# Patient Record
Sex: MALE | Race: WHITE | NOT HISPANIC OR LATINO | URBAN - METROPOLITAN AREA
[De-identification: names, ages, dates, MRNs, and addresses within clinical notes are randomized per-mention and may not be internally consistent; named-entity substitution may affect disease eponyms.]

---

## 2021-06-17 ENCOUNTER — INPATIENT (INPATIENT)
Facility: HOSPITAL | Age: 51
LOS: 1 days | Discharge: HOME | DRG: 246 | End: 2021-06-19
Attending: HOSPITALIST | Admitting: INTERNAL MEDICINE
Payer: COMMERCIAL

## 2021-06-17 VITALS
WEIGHT: 210.1 LBS | HEART RATE: 120 BPM | TEMPERATURE: 99 F | RESPIRATION RATE: 20 BRPM | SYSTOLIC BLOOD PRESSURE: 157 MMHG | DIASTOLIC BLOOD PRESSURE: 93 MMHG | OXYGEN SATURATION: 95 % | HEIGHT: 67 IN

## 2021-06-17 LAB
A1C WITH ESTIMATED AVERAGE GLUCOSE RESULT: 8.9 % — HIGH (ref 4–5.6)
ALBUMIN SERPL ELPH-MCNC: 3.9 G/DL — SIGNIFICANT CHANGE UP (ref 3.3–5)
ALBUMIN SERPL ELPH-MCNC: 4.5 G/DL — SIGNIFICANT CHANGE UP (ref 3.3–5)
ALP SERPL-CCNC: 73 U/L — SIGNIFICANT CHANGE UP (ref 40–120)
ALP SERPL-CCNC: 83 U/L — SIGNIFICANT CHANGE UP (ref 40–120)
ALT FLD-CCNC: 38 U/L — SIGNIFICANT CHANGE UP (ref 10–45)
ALT FLD-CCNC: 40 U/L — SIGNIFICANT CHANGE UP (ref 10–45)
ANION GAP SERPL CALC-SCNC: 12 MMOL/L — SIGNIFICANT CHANGE UP (ref 5–17)
ANION GAP SERPL CALC-SCNC: 15 MMOL/L — SIGNIFICANT CHANGE UP (ref 5–17)
APTT BLD: 170.8 SEC — CRITICAL HIGH (ref 27.5–35.5)
APTT BLD: 30.6 SEC — SIGNIFICANT CHANGE UP (ref 27.5–35.5)
AST SERPL-CCNC: 134 U/L — HIGH (ref 10–40)
AST SERPL-CCNC: 93 U/L — HIGH (ref 10–40)
BASOPHILS # BLD AUTO: 0.04 K/UL — SIGNIFICANT CHANGE UP (ref 0–0.2)
BASOPHILS # BLD AUTO: 0.05 K/UL — SIGNIFICANT CHANGE UP (ref 0–0.2)
BASOPHILS NFR BLD AUTO: 0.3 % — SIGNIFICANT CHANGE UP (ref 0–2)
BASOPHILS NFR BLD AUTO: 0.4 % — SIGNIFICANT CHANGE UP (ref 0–2)
BILIRUB SERPL-MCNC: 0.9 MG/DL — SIGNIFICANT CHANGE UP (ref 0.2–1.2)
BILIRUB SERPL-MCNC: 1.2 MG/DL — SIGNIFICANT CHANGE UP (ref 0.2–1.2)
BLD GP AB SCN SERPL QL: NEGATIVE — SIGNIFICANT CHANGE UP
BUN SERPL-MCNC: 10 MG/DL — SIGNIFICANT CHANGE UP (ref 7–23)
BUN SERPL-MCNC: 11 MG/DL — SIGNIFICANT CHANGE UP (ref 7–23)
CALCIUM SERPL-MCNC: 8.5 MG/DL — SIGNIFICANT CHANGE UP (ref 8.4–10.5)
CALCIUM SERPL-MCNC: 9.4 MG/DL — SIGNIFICANT CHANGE UP (ref 8.4–10.5)
CHLORIDE SERPL-SCNC: 102 MMOL/L — SIGNIFICANT CHANGE UP (ref 96–108)
CHLORIDE SERPL-SCNC: 105 MMOL/L — SIGNIFICANT CHANGE UP (ref 96–108)
CHOLEST SERPL-MCNC: 190 MG/DL — SIGNIFICANT CHANGE UP
CK MB CFR SERPL CALC: 64.8 NG/ML — HIGH (ref 0–6.7)
CK SERPL-CCNC: 1093 U/L — HIGH (ref 30–200)
CO2 SERPL-SCNC: 22 MMOL/L — SIGNIFICANT CHANGE UP (ref 22–31)
CO2 SERPL-SCNC: 24 MMOL/L — SIGNIFICANT CHANGE UP (ref 22–31)
CREAT SERPL-MCNC: 0.79 MG/DL — SIGNIFICANT CHANGE UP (ref 0.5–1.3)
CREAT SERPL-MCNC: 0.92 MG/DL — SIGNIFICANT CHANGE UP (ref 0.5–1.3)
EOSINOPHIL # BLD AUTO: 0.01 K/UL — SIGNIFICANT CHANGE UP (ref 0–0.5)
EOSINOPHIL # BLD AUTO: 0.02 K/UL — SIGNIFICANT CHANGE UP (ref 0–0.5)
EOSINOPHIL NFR BLD AUTO: 0.1 % — SIGNIFICANT CHANGE UP (ref 0–6)
EOSINOPHIL NFR BLD AUTO: 0.1 % — SIGNIFICANT CHANGE UP (ref 0–6)
ESTIMATED AVERAGE GLUCOSE: 209 MG/DL — HIGH (ref 68–114)
GLUCOSE BLDC GLUCOMTR-MCNC: 201 MG/DL — HIGH (ref 70–99)
GLUCOSE SERPL-MCNC: 203 MG/DL — HIGH (ref 70–99)
GLUCOSE SERPL-MCNC: 228 MG/DL — HIGH (ref 70–99)
HCT VFR BLD CALC: 45.1 % — SIGNIFICANT CHANGE UP (ref 39–50)
HCT VFR BLD CALC: 48.3 % — SIGNIFICANT CHANGE UP (ref 39–50)
HDLC SERPL-MCNC: 39 MG/DL — LOW
HGB BLD-MCNC: 15.7 G/DL — SIGNIFICANT CHANGE UP (ref 13–17)
HGB BLD-MCNC: 16.6 G/DL — SIGNIFICANT CHANGE UP (ref 13–17)
IMM GRANULOCYTES NFR BLD AUTO: 0.5 % — SIGNIFICANT CHANGE UP (ref 0–1.5)
IMM GRANULOCYTES NFR BLD AUTO: 0.5 % — SIGNIFICANT CHANGE UP (ref 0–1.5)
INR BLD: 1.13 — SIGNIFICANT CHANGE UP (ref 0.88–1.16)
LIPID PNL WITH DIRECT LDL SERPL: 137 MG/DL — HIGH
LYMPHOCYTES # BLD AUTO: 14.4 % — SIGNIFICANT CHANGE UP (ref 13–44)
LYMPHOCYTES # BLD AUTO: 16 % — SIGNIFICANT CHANGE UP (ref 13–44)
LYMPHOCYTES # BLD AUTO: 2.08 K/UL — SIGNIFICANT CHANGE UP (ref 1–3.3)
LYMPHOCYTES # BLD AUTO: 2.09 K/UL — SIGNIFICANT CHANGE UP (ref 1–3.3)
MAGNESIUM SERPL-MCNC: 1.9 MG/DL — SIGNIFICANT CHANGE UP (ref 1.6–2.6)
MCHC RBC-ENTMCNC: 28.7 PG — SIGNIFICANT CHANGE UP (ref 27–34)
MCHC RBC-ENTMCNC: 29.1 PG — SIGNIFICANT CHANGE UP (ref 27–34)
MCHC RBC-ENTMCNC: 34.4 GM/DL — SIGNIFICANT CHANGE UP (ref 32–36)
MCHC RBC-ENTMCNC: 34.8 GM/DL — SIGNIFICANT CHANGE UP (ref 32–36)
MCV RBC AUTO: 83.5 FL — SIGNIFICANT CHANGE UP (ref 80–100)
MCV RBC AUTO: 83.6 FL — SIGNIFICANT CHANGE UP (ref 80–100)
MONOCYTES # BLD AUTO: 0.8 K/UL — SIGNIFICANT CHANGE UP (ref 0–0.9)
MONOCYTES # BLD AUTO: 0.93 K/UL — HIGH (ref 0–0.9)
MONOCYTES NFR BLD AUTO: 6.1 % — SIGNIFICANT CHANGE UP (ref 2–14)
MONOCYTES NFR BLD AUTO: 6.4 % — SIGNIFICANT CHANGE UP (ref 2–14)
NEUTROPHILS # BLD AUTO: 10.08 K/UL — HIGH (ref 1.8–7.4)
NEUTROPHILS # BLD AUTO: 11.32 K/UL — HIGH (ref 1.8–7.4)
NEUTROPHILS NFR BLD AUTO: 76.9 % — SIGNIFICANT CHANGE UP (ref 43–77)
NEUTROPHILS NFR BLD AUTO: 78.3 % — HIGH (ref 43–77)
NON HDL CHOLESTEROL: 151 MG/DL — HIGH
NRBC # BLD: 0 /100 WBCS — SIGNIFICANT CHANGE UP (ref 0–0)
NRBC # BLD: 0 /100 WBCS — SIGNIFICANT CHANGE UP (ref 0–0)
PHOSPHATE SERPL-MCNC: 2.5 MG/DL — SIGNIFICANT CHANGE UP (ref 2.5–4.5)
PLATELET # BLD AUTO: 218 K/UL — SIGNIFICANT CHANGE UP (ref 150–400)
PLATELET # BLD AUTO: 251 K/UL — SIGNIFICANT CHANGE UP (ref 150–400)
POTASSIUM SERPL-MCNC: 3.4 MMOL/L — LOW (ref 3.5–5.3)
POTASSIUM SERPL-MCNC: 3.4 MMOL/L — LOW (ref 3.5–5.3)
POTASSIUM SERPL-SCNC: 3.4 MMOL/L — LOW (ref 3.5–5.3)
POTASSIUM SERPL-SCNC: 3.4 MMOL/L — LOW (ref 3.5–5.3)
PROT SERPL-MCNC: 7.1 G/DL — SIGNIFICANT CHANGE UP (ref 6–8.3)
PROT SERPL-MCNC: 8.2 G/DL — SIGNIFICANT CHANGE UP (ref 6–8.3)
PROTHROM AB SERPL-ACNC: 13.5 SEC — SIGNIFICANT CHANGE UP (ref 10.6–13.6)
RBC # BLD: 5.4 M/UL — SIGNIFICANT CHANGE UP (ref 4.2–5.8)
RBC # BLD: 5.78 M/UL — SIGNIFICANT CHANGE UP (ref 4.2–5.8)
RBC # FLD: 12.8 % — SIGNIFICANT CHANGE UP (ref 10.3–14.5)
RBC # FLD: 12.9 % — SIGNIFICANT CHANGE UP (ref 10.3–14.5)
RH IG SCN BLD-IMP: POSITIVE — SIGNIFICANT CHANGE UP
SARS-COV-2 RNA SPEC QL NAA+PROBE: NEGATIVE — SIGNIFICANT CHANGE UP
SODIUM SERPL-SCNC: 139 MMOL/L — SIGNIFICANT CHANGE UP (ref 135–145)
SODIUM SERPL-SCNC: 141 MMOL/L — SIGNIFICANT CHANGE UP (ref 135–145)
TRIGL SERPL-MCNC: 71 MG/DL — SIGNIFICANT CHANGE UP
TROPONIN T SERPL-MCNC: 0.67 NG/ML — CRITICAL HIGH (ref 0–0.01)
TSH SERPL-MCNC: 1.67 UIU/ML — SIGNIFICANT CHANGE UP (ref 0.27–4.2)
WBC # BLD: 13.1 K/UL — HIGH (ref 3.8–10.5)
WBC # BLD: 14.46 K/UL — HIGH (ref 3.8–10.5)
WBC # FLD AUTO: 13.1 K/UL — HIGH (ref 3.8–10.5)
WBC # FLD AUTO: 14.46 K/UL — HIGH (ref 3.8–10.5)

## 2021-06-17 PROCEDURE — 93010 ELECTROCARDIOGRAM REPORT: CPT

## 2021-06-17 PROCEDURE — 93308 TTE F-UP OR LMTD: CPT | Mod: 26

## 2021-06-17 PROCEDURE — 99152 MOD SED SAME PHYS/QHP 5/>YRS: CPT

## 2021-06-17 PROCEDURE — 71045 X-RAY EXAM CHEST 1 VIEW: CPT | Mod: 26

## 2021-06-17 PROCEDURE — 92941 PRQ TRLML REVSC TOT OCCL AMI: CPT | Mod: LD

## 2021-06-17 PROCEDURE — 93458 L HRT ARTERY/VENTRICLE ANGIO: CPT | Mod: 26,59

## 2021-06-17 PROCEDURE — 99291 CRITICAL CARE FIRST HOUR: CPT

## 2021-06-17 RX ORDER — POTASSIUM CHLORIDE 20 MEQ
40 PACKET (EA) ORAL ONCE
Refills: 0 | Status: COMPLETED | OUTPATIENT
Start: 2021-06-17 | End: 2021-06-17

## 2021-06-17 RX ORDER — METOPROLOL TARTRATE 50 MG
5 TABLET ORAL ONCE
Refills: 0 | Status: COMPLETED | OUTPATIENT
Start: 2021-06-17 | End: 2021-06-17

## 2021-06-17 RX ORDER — HEPARIN SODIUM 5000 [USP'U]/ML
INJECTION INTRAVENOUS; SUBCUTANEOUS
Qty: 25000 | Refills: 0 | Status: DISCONTINUED | OUTPATIENT
Start: 2021-06-17 | End: 2021-06-17

## 2021-06-17 RX ORDER — INSULIN LISPRO 100/ML
VIAL (ML) SUBCUTANEOUS AT BEDTIME
Refills: 0 | Status: DISCONTINUED | OUTPATIENT
Start: 2021-06-17 | End: 2021-06-19

## 2021-06-17 RX ORDER — DEXTROSE 50 % IN WATER 50 %
25 SYRINGE (ML) INTRAVENOUS ONCE
Refills: 0 | Status: DISCONTINUED | OUTPATIENT
Start: 2021-06-17 | End: 2021-06-19

## 2021-06-17 RX ORDER — HEPARIN SODIUM 5000 [USP'U]/ML
4000 INJECTION INTRAVENOUS; SUBCUTANEOUS EVERY 6 HOURS
Refills: 0 | Status: DISCONTINUED | OUTPATIENT
Start: 2021-06-17 | End: 2021-06-17

## 2021-06-17 RX ORDER — TICAGRELOR 90 MG/1
90 TABLET ORAL EVERY 12 HOURS
Refills: 0 | Status: DISCONTINUED | OUTPATIENT
Start: 2021-06-18 | End: 2021-06-19

## 2021-06-17 RX ORDER — DEXTROSE 50 % IN WATER 50 %
15 SYRINGE (ML) INTRAVENOUS ONCE
Refills: 0 | Status: DISCONTINUED | OUTPATIENT
Start: 2021-06-17 | End: 2021-06-19

## 2021-06-17 RX ORDER — ASPIRIN/CALCIUM CARB/MAGNESIUM 324 MG
81 TABLET ORAL DAILY
Refills: 0 | Status: DISCONTINUED | OUTPATIENT
Start: 2021-06-18 | End: 2021-06-19

## 2021-06-17 RX ORDER — GLUCAGON INJECTION, SOLUTION 0.5 MG/.1ML
1 INJECTION, SOLUTION SUBCUTANEOUS ONCE
Refills: 0 | Status: DISCONTINUED | OUTPATIENT
Start: 2021-06-17 | End: 2021-06-19

## 2021-06-17 RX ORDER — ACETAMINOPHEN 500 MG
650 TABLET ORAL EVERY 6 HOURS
Refills: 0 | Status: DISCONTINUED | OUTPATIENT
Start: 2021-06-17 | End: 2021-06-19

## 2021-06-17 RX ORDER — NITROGLYCERIN 6.5 MG
0.4 CAPSULE, EXTENDED RELEASE ORAL
Refills: 0 | Status: DISCONTINUED | OUTPATIENT
Start: 2021-06-17 | End: 2021-06-19

## 2021-06-17 RX ORDER — TICAGRELOR 90 MG/1
180 TABLET ORAL ONCE
Refills: 0 | Status: COMPLETED | OUTPATIENT
Start: 2021-06-17 | End: 2021-06-17

## 2021-06-17 RX ORDER — METOPROLOL TARTRATE 50 MG
12.5 TABLET ORAL
Refills: 0 | Status: DISCONTINUED | OUTPATIENT
Start: 2021-06-17 | End: 2021-06-18

## 2021-06-17 RX ORDER — SODIUM CHLORIDE 9 MG/ML
1000 INJECTION, SOLUTION INTRAVENOUS
Refills: 0 | Status: DISCONTINUED | OUTPATIENT
Start: 2021-06-17 | End: 2021-06-19

## 2021-06-17 RX ORDER — INSULIN LISPRO 100/ML
VIAL (ML) SUBCUTANEOUS
Refills: 0 | Status: DISCONTINUED | OUTPATIENT
Start: 2021-06-17 | End: 2021-06-19

## 2021-06-17 RX ORDER — ATORVASTATIN CALCIUM 80 MG/1
80 TABLET, FILM COATED ORAL AT BEDTIME
Refills: 0 | Status: DISCONTINUED | OUTPATIENT
Start: 2021-06-17 | End: 2021-06-19

## 2021-06-17 RX ORDER — CHLORHEXIDINE GLUCONATE 213 G/1000ML
1 SOLUTION TOPICAL
Refills: 0 | Status: DISCONTINUED | OUTPATIENT
Start: 2021-06-17 | End: 2021-06-17

## 2021-06-17 RX ORDER — HEPARIN SODIUM 5000 [USP'U]/ML
4000 INJECTION INTRAVENOUS; SUBCUTANEOUS ONCE
Refills: 0 | Status: COMPLETED | OUTPATIENT
Start: 2021-06-17 | End: 2021-06-17

## 2021-06-17 RX ADMIN — Medication 40 MILLIEQUIVALENT(S): at 21:32

## 2021-06-17 RX ADMIN — Medication 650 MILLIGRAM(S): at 23:09

## 2021-06-17 RX ADMIN — Medication 0.4 MILLIGRAM(S): at 18:30

## 2021-06-17 RX ADMIN — ATORVASTATIN CALCIUM 80 MILLIGRAM(S): 80 TABLET, FILM COATED ORAL at 21:31

## 2021-06-17 RX ADMIN — TICAGRELOR 180 MILLIGRAM(S): 90 TABLET ORAL at 18:30

## 2021-06-17 RX ADMIN — HEPARIN SODIUM 4000 UNIT(S): 5000 INJECTION INTRAVENOUS; SUBCUTANEOUS at 18:24

## 2021-06-17 RX ADMIN — Medication 12.5 MILLIGRAM(S): at 23:59

## 2021-06-17 RX ADMIN — HEPARIN SODIUM 1000 UNIT(S)/HR: 5000 INJECTION INTRAVENOUS; SUBCUTANEOUS at 18:29

## 2021-06-17 RX ADMIN — Medication 650 MILLIGRAM(S): at 22:09

## 2021-06-17 RX ADMIN — Medication 5 MILLIGRAM(S): at 18:27

## 2021-06-17 NOTE — ED PROVIDER NOTE - OBJECTIVE STATEMENT
50 yo male h/o HTN, DM biba c/o cp.  Pt notes exertional cp/sob x ~ 2 wk, constant cp today worse w exertion.  Pt went to urgent care and noted to have abnl ekg, sent by ems.  Pt received asa 325 mg at urgent care and ntg x 3 w ems.  Pain 6/10.  L sided/sscp - pressure like, no radiation, no h/o similar prior to 2 wk ago.  No palpitations, le edema.  Pt reports nl cardiac testing many yrs ago.  No fever, cough.  No fh cad.  EMS ekg w st elevations 1, L, v1, 2, st depressions 2, 3

## 2021-06-17 NOTE — PATIENT PROFILE ADULT - DO YOU FEEL THREATENED BY OTHERS?
no Implemented All Universal Safety Interventions:  [place] to call system. Call bell, personal items and telephone within reach. Instruct patient to call for assistance. Room bathroom lighting operational. Non-slip footwear when patient is off stretcher. Physically safe environment: no spills, clutter or unnecessary equipment. Stretcher in lowest position, wheels locked, appropriate side rails in place.

## 2021-06-17 NOTE — ED PROVIDER NOTE - CLINICAL SUMMARY MEDICAL DECISION MAKING FREE TEXT BOX
Pt biba for cp w ekg changes concerning for stemi.  EKG in ed also w stemi.  STEMI code called.  Pt given brilinta, heparin ordered, metoprolol for elevated bp/hr.  Cards fellow in ed to consent pt and bring to cath lab.  Pt discussed w CCU fellow.  Pt admitted to CCU, to cath lab for eval/treatment.

## 2021-06-17 NOTE — H&P ADULT - ASSESSMENT
51 Y M with PMHX DMT2 on no medications presented to the ED with chest pain and STEMI, s/p DAVIS  %, EF 35-40% on 6/17 transferred to the CCU for post CATH monitoring.         Problem/Plan     NEURO:  No active issues.    CARDIOVASCULAR:  #STEMI. ST elevations in V2-V3.      PULM:  No active issue, Satting well on Ra    GI:  No active issue,    RENAL:      ENDO:       ID:   No active issue,    HEME/ONC:      PREVENTIVE:   Fluids:   Diet: DASH/TLC   DVT ppx: heparin gtt  GI ppx: None  CODE: FULL  Disp: CCU    51 Y M with PMHX DMT2 on no medications presented to the ED with chest pain and STEMI, s/p DAVIS  %, EF 35-40% on 6/17 transferred to the CCU for post CATH monitoring.         Problem/Plan     NEURO:  No active issues.    CARDIOVASCULAR:  #STEMI  Presented to the ED with pressure like exertional chest pain for 2 weeks relived by rest, found out  ST elevations in V2-V3. diagnosed with STEMI and s/p cath received one DAVIS on %.  CATH report 6/17:  L dominant. LM normal. % thrombotic . LVEF 35-40% mod Ant wall hypokinesis, Succesful theromboctemy +PCI on LAD DAVIS   -S/p Loaded Brillinta and ASA in the ED for cath  -C/w ASA 81 mg daily  -C/w Brilinta 90mg BID start AM   - Lipid panel: HDL 39, DEQ802, TG 71  - HA1c 8.9  - TSH 1.6  - Off from heparin gtt  -ASCVD 9.1% recommended high intensity of statin   - C/w Atorvastatin 80mg bedtime      #Pump    LVEF 35-40%   -Started on Lopressor 12.5 BID   -F/u TTE in AM      #Rhythm  NSR HR controlled      PULM:  No active issue, Satting well on Ra    GI:  No active issue,    RENAL:  No active issue     ENDO:   PMHx of DMT2 on no medication. FH of DMT2 on both parents.   -  -HA1c 8.9  -Started on mISS     ID:   No active issue,    HEME/ONC:  Leukocytoses likely reactive     PREVENTIVE:   Fluids: None   Diet: DASH/TLC   DVT ppx: heparin gtt  GI ppx: None  CODE: FULL  Disp: CCU    51 Y M with PMHX DMT2 on no medications presented to the ED with chest pain for 2 weeks, Admitted for STEMI, s/p DAVIS  %, EF 35-40% on 6/17 transferred to the CCU for post CATH monitoring.         Problem/Plan     NEURO:  No active issues.    CARDIOVASCULAR:  #STEMI  Presented to the ED with pressure like exertional chest pain for 2 weeks relived by rest, found ST elevations in V2-V3. diagnosed with STEMI and s/p cath received one DAVIS on %.  CATH report 6/17:  L dominant. LM normal. % thrombotic . LVEF 35-40% mod Ant wall hypokinesis, Successful thrombectomy +PCI on LAD DAVIS   -S/p Loaded Brillinta and ASA in the ED for cath  -C/w ASA 81 mg daily  -C/w Brilinta 90mg BID start AM   - Lipid panel: HDL 39, BTU697, TG 71  - HA1c 8.9  - TSH 1.6  - Off from heparin gtt  -ASCVD 9.1% recommended high intensity of statin   - C/w Atorvastatin 80mg bedtime  -Started on Lopressor 12.5 BID   -Plan for start ACEI/ARB tomorrow       #HFrEF   LVEF 35-40% Likely acute reduced in setting of STEMI  -Started on Lopressor 12.5 BID   -Plan for start ACEI/ARB tomorrow   -F/u TTE in AM  - Consider repeat TTE after dc for re-evaluate the EF, plan with outpatient cardiology       #Rhythm  NSR HR controlled      #Volume  Euvolemic     PULM:  No active issue, Satting well on Ra    GI:  No active issue,    RENAL:  No active issue     ENDO:   PMHx of DMT2 on no medication. FH of DMT2 on both parents.   -  -HA1c 8.9  -Started on mISS   -Consider start metformin after dc     ID:   No active issue,    HEME/ONC:  Leukocytoses likely reactive     PREVENTIVE:   Fluids: s/p 1L NS bolus   E: Replete as needed keep K>4, Mg >2   Diet: DASH/TLC + Consistent carbohydrate   DVT ppx: SCDs for now. Will start Lovenox tomorrow.   GI ppx: None  CODE: FULL  Disp: CCU    51 Y M with PMHX DMT2 on no medications presented to the ED with chest pain for 2 weeks, Admitted for STEMI, s/p DAVIS  %, EF 35-40% on 6/17 transferred to the CCU for post CATH monitoring.         Problem/Plan     NEURO:  No active issues.    CARDIOVASCULAR:  #STEMI  Presented to the ED with pressure like exertional chest pain for 2 weeks relived by rest, found ST elevations in V2-V3. diagnosed with STEMI and s/p cath received one DAVIS on %.  CATH report 6/17:  L dominant. LM normal. % thrombotic . LVEF 35-40% mod Ant wall hypokinesis, Successful thrombectomy +PCI on LAD DAVIS   -S/p Loaded Brillinta and ASA in the ED for cath  -C/w ASA 81 mg daily  -C/w Brilinta 90mg BID start AM   - Lipid panel: HDL 39, NOG939, TG 71  - HA1c 8.9  - TSH 1.6  - Off from heparin gtt  -ASCVD 9.1% recommended high intensity of statin   - C/w Atorvastatin 80mg bedtime  -Started on Lopressor 12.5 BID   -Plan for start ACEI/ARB tomorrow       #HFrEF   LVEF 35-40% Likely acute reduced in setting of STEMI  -Started on Lopressor 12.5 BID   -Plan for start ACEI/ARB tomorrow   -F/u TTE in AM  - Consider repeat TTE after dc for re-evaluate the EF, plan with outpatient cardiology       #Rhythm  NSR HR controlled      #Volume  Euvolemic     PULM:  No active issue, Satting well on Ra    GI:  No active issue,    RENAL:  No active issue     ENDO:   PMHx of DMT2 on no medication. FH of DMT2 on both parents.   -  -HA1c 8.9  -Started on mISS   -Consider start metformin after dc     ID:   No active issue,    HEME/ONC:  Leukocytoses likely reactive       SKIN  #Rosacea   Usually flare up with the body temperature  -Use metronidazole? cream at home sometimes PRN     PREVENTIVE:   Fluids: s/p 1L NS bolus   E: Replete as needed keep K>4, Mg >2   Diet: DASH/TLC + Consistent carbohydrate   DVT ppx: SCDs for now. Will start Lovenox tomorrow.   GI ppx: None  CODE: FULL  Disp: CCU

## 2021-06-17 NOTE — H&P ADULT - NSHPLABSRESULTS_GEN_ALL_CORE
LABS:                         15.7   14.46 >-----< 218           ( 06-17-21 @ 20:55 )             45.1       139  |  105  |   10  ----------------------< 203    (06-17-21 @ 20:55)     3.4  |  22  |  0.79    Anion Gap: 12  ,   141  |  102  |   11  ----------------------< 228    (06-17-21 @ 18:23)     3.4  |  24  |  0.92    Anion Gap: 15    Ca   8.5   (06-17-21 @ 20:55)  Mg   1.9   (06-17-21 @ 20:55)  Phos 2.5   (06-17-21 @ 20:55)       TP 8.5     |  AST 3.9    -------------------------     Alb x     |  ALT x               (06-17-21 @ 20:55)  -------------------------     T-bili 3.9  |  AlkPh 73    D-bili x   COAGULATION STUDIES:     aPTT  170.8 sec    (06-17-21 @ 20:55)     PT     x        (06-17-21 @ 20:55)     INR    x             (06-17-21 @ 20:55), COAGULATION STUDIES:     aPTT  30.6 sec    (06-17-21 @ 18:23)     PT     13.5 sec    (06-17-21 @ 18:23)     INR    1.13          (06-17-21 @ 18:23)     POCT Blood Glucose.: 201 mg/dL (06-17-21 @ 21:37)      I/O SUMMARY:    06-17-21 @ 07:01  -  06-17-21 @ 23:02  --------------------------------------------------------  IN: 200 mL / OUT: 200 mL / NET: 0 mL

## 2021-06-17 NOTE — H&P ADULT - NSICDXFAMILYHX_GEN_ALL_CORE_FT
FAMILY HISTORY:  Father  Still living? Unknown  FH: type 2 diabetes, Age at diagnosis: Age Unknown    Mother  Still living? Unknown  FH: type 2 diabetes, Age at diagnosis: Age Unknown

## 2021-06-17 NOTE — ED ADULT NURSE NOTE - OBJECTIVE STATEMENT
pt received into RESUS room BIBA from urgent care for eval of chest pain x 1 week EKG at [facility] concerning for STEMI EMS placed 20G PIV to L for arm and gave 325 ASA and SL Nitro  x3 ent route pt reports some change and relief of pain s/p nitro. upon arrival Md Director met pt on ems stretcher 12 lead ekgs done and scanned pt A&O3 appears comfortable no head ache blurry vision neuro intact no n/v abd pain diarrhea constipation urinary symptoms denies sob cough fever chills runny nose abd pain abd soft nondistended. new 18G placed to R fore arm labs drawn and sent crisis covid swab sent pt placed on defib pads and portable monitor medicated per orders respirations even and unlabored sating at 98% on 2L NC st elevations noted on ccm pt taken to CATH LAB

## 2021-06-17 NOTE — H&P ADULT - NSHPPHYSICALEXAM_GEN_ALL_CORE
Vital Signs Last 24 Hrs  T(C): 37.1 (17 Jun 2021 18:30), Max: 37.4 (17 Jun 2021 18:06)  T(F): 98.7 (17 Jun 2021 18:30), Max: 99.4 (17 Jun 2021 18:06)  HR: 98 (17 Jun 2021 18:30) (98 - 120)  BP: 150/88 (17 Jun 2021 18:30) (150/88 - 157/93)  BP(mean): --  RR: 16 (17 Jun 2021 18:30) (16 - 20)  SpO2: 98% (17 Jun 2021 18:30) (95% - 98%)    PHYSICAL EXAM:  GENERAL: Pt lying in bed comfortably in NAD  HEAD:  Atraumatic   EYES: EOMI, PERRL, conjunctiva and sclera clear  ENT: Moist mucous membranes  NECK: Supple, No JVD  CHEST/LUNG: Clear to auscultation bilaterally; No rales, rhonchi, wheezing or rubs. Unlabored respirations  HEART: Regular rate and rhythm; No murmurs, rubs, or gallops  ABDOMEN: Bowel sounds present; Soft, Nontender, Nondistended. No guarding or rigidity    EXTREMITIES:  2+ Peripheral Pulses, brisk capillary refill. No clubbing, cyanosis, or edema. Right wrist TR band for access CATH, no bleeding  NERVOUS SYSTEM:  Alert & Oriented X3, speech clear. Answers questions appropriately. Facial movements symmetrical, no facial droop, tongue protrusion midline. Full and equal 5/5 strength B/L upper and lower extremities. +reflexes B/L LE. Sensation intact. No motor drift. No deficits   MSK: FROM x 4 extremities   SKIN: No rashes or lesions

## 2021-06-17 NOTE — H&P ADULT - HISTORY OF PRESENT ILLNESS
HPI:     51M PMH obesity, ____ presented with chest pain x2 weeks    Was given  and Nitro x3 en route to ED.    In the ED:  Initial vital signs: T: 99.4 F, HR: 120, BP: 157/93, RR: 20, SpO2: 95% on 2L NC  ED course:   Labs: significant for WBC 13.1, ______. COVID negative.  CXR: ________  EKG: ST elevations V2-V3  Medications:  heparin 4000U IV x1 then heparin gtt, metoprolol tartrate 5mg IV x1, ticagrelor 180 mg PO x1, nitroglycerin 0.4 mg subLingual x1  Consults: Cardiology   Patient is 50yo M PMH obesity, ____ presented with chest pain x2 weeks    Was given  and Nitro x3 en route to ED.    In the ED:  Initial vital signs: T: 99.4 F, HR: 120, BP: 157/93, RR: 20, SpO2: 95% on 2L NC  ED course:   Labs: significant for WBC 13.1, ______. COVID negative.  CXR: ________  EKG: ST elevations V2-V3  Medications:  heparin 4000U IV x1 then heparin gtt, metoprolol tartrate 5mg IV x1, ticagrelor 180 mg PO x1, nitroglycerin 0.4 mg subLingual x1  Consults: Cardiology   Patient is 52yo M PMH obesity,DMT2 (Stopped taking metformin 3-4 years ago) presented with chest pain x2 weeks to the ED diagnosed with STEMI and sent for CATH and possible PCI. He had pressure like chest pain 6-7/10 started 2 weeks ago worsening with activity and relieved with rest. He denies HA, dizziness, syncope, nausea, vomiting at that time. He was diagnosed with DMT2 years ago with HA1C 12 but stopped taking metformin due to lifestyle and diet changing and HA1c improvement. He has family history of DMT2 in both parents but denies Cardiology problems in family. He went to the [facility] MD for his chest pain which ECG showed ST changes, was given  and Nitro x3 en route. and he was sent to the [facility] ED by ambulance. In the ED given Brilinta, heparin ordered, metoprolol for elevated bp/hr and sent to the Cath lab.   Cath reports L dominant,. LM normal. % thrombotic . LVEF 35-40% mod Ant wall hypokinesis, Successful thrombectomy +PCI on LAD DAVIS.   On CCU arrival: Post cath he was calm in no acute distress, denies HA, SOB, CP, abdominal pain. Right wrist access TR band.         In the ED:  Initial vital signs: T: 99.4 F, HR: 120, BP: 157/93, RR: 20, SpO2: 95% on 2L NC  ED course:   Labs: significant for WBC 13.1, ______. COVID negative.  CXR: ________  EKG: ST elevations V2-V3  Medications:  heparin 4000U IV x1 then heparin gtt, metoprolol tartrate 5mg IV x1, ticagrelor 180 mg PO x1, nitroglycerin 0.4 mg subLingual x1  Consults: Cardiology   Patient is 52yo M PMH obesity,DMT2 (Stopped taking metformin 3-4 years ago) presented with chest pain x2 weeks to the ED diagnosed with STEMI and sent for CATH and possible PCI. He had pressure like chest pain 6-7/10 started 2 weeks ago worsening with activity and relieved with rest. He denies HA, dizziness, syncope, nausea, vomiting at that time. He was diagnosed with DMT2 years ago with HA1C 12 but stopped taking metformin due to lifestyle and diet changing and HA1c improvement. He has family history of DMT2 in both parents but denies Cardiology problems in family. He went to the [facility] MD for his chest pain which ECG showed ST changes, was given  and Nitro x3 en route. and he was sent to the [facility] ED by ambulance. In the ED given Brilinta, heparin ordered, metoprolol for elevated bp/hr and sent to the Cath lab.   ED V/S: T: 99.4 F, HR: 120, BP: 157/93, RR: 20, SpO2: 95% on 2L NC  EKG: ST elevations V2-V3  Medications:  heparin 4000U IV x1 then heparin gtt, metoprolol tartrate 5mg IV x1, ticagrelor 180 mg PO x1, nitroglycerin 0.4 mg subLingual x1    Cath reports L dominant,. LM normal. % thrombotic . LVEF 35-40% mod Ant wall hypokinesis, Successful thrombectomy +PCI on LAD DAVIS.     On CCU arrival: Post cath he was calm in no acute distress, denies HA, SOB, CP, abdominal pain. Right wrist access TR band.   V/S: HR 80, bp 134/84, RR 26,  Sat 97%  ECG post cath: DESTINY on V2-V3 and T inversion in V2-V5      OMHx as above  PSH: none  Allergy: None  FH: MOM and Dad both DMT2 and dementia   Medication: Denies taking any home medications. Stopped taking Metformin 3-4 years ago  Patient is 50yo M [facility] obesity, DMT2 (Stopped taking metformin 3-4 years ago) presented with chest pain x2 weeks to the ED diagnosed with STEMI and sent for CATH and PCI. He had intermittent pressure like substernal non radiating chest pain 6-7/10 started 2 weeks ago worsening with activity and relieved with rest. He denies HA, dizziness, syncope, nausea, vomiting at that time. He has never had this symptoms before. He went to the [facility] MD for worsening of chest pain which ECG showed ST changes, was given  and Nitro x3 and he was sent to the [facility] ED by ambulance.   He was diagnosed with DMT2 years ago with HA1C 12 but stopped taking metformin due to lifestyle and diet changing and HA1c improvement. He has family history of DMT2 in both parents but denies Cardiology problems in family.     ED V/S: T: 99.4 F, HR: 120, BP: 157/93, RR: 20, SpO2: 95% on 2L NC  EKG: ST elevations V2-V3  Treatment:  heparin 4000U IV x1 then heparin gtt, metoprolol tartrate 5mg IV x1, ticagrelor 180 mg PO x1, nitroglycerin 0.4 mg subLingual x1  Cardiology consulted in the ED and sent to the Cath lab.     Cath reports L dominant,. LM normal. % thrombotic . LVEF 35-40% mod Ant wall hypokinesis, Successful thrombectomy +PCI on LAD DAVIS.     On CCU arrival: Post cath he was calm in no acute distress, denies HA, SOB, CP, abdominal pain. Right wrist access TR band.   V/S: HR 80, bp 134/84, RR 26,  Sat 97%  ECG post cath: DESTINY on V2-V3 and T inversion in V2-V5      PMHx as above  PSH: none  Allergy: None  FH: MOM and Dad both DMT2 and dementia   SH: Denies ETOH, smoking cigarettes and drug use  Medication: Denies taking any home medications. Stopped taking Metformin 3-4 years ago  Patient is 52yo M [facility] obesity, DMT2 (Stopped taking metformin 3-4 years ago), Rosacea, presented with chest pain x2 weeks to the ED diagnosed with STEMI and sent for CATH and PCI. He had intermittent pressure like substernal non radiating chest pain 6-7/10 started 2 weeks ago worsening with activity and relieved with rest. He denies HA, dizziness, syncope, nausea, vomiting at that time. He has never had this symptoms before. He went to the [facility] MD for worsening of chest pain which ECG showed ST changes, was given  and Nitro x3 and he was sent to the [facility] ED by ambulance.   He was diagnosed with DMT2 years ago with HA1C 12 but stopped taking metformin due to lifestyle and diet changing and HA1c improvement. He has family history of DMT2 in both parents but denies Cardiology problems in family.     ED V/S: T: 99.4 F, HR: 120, BP: 157/93, RR: 20, SpO2: 95% on 2L NC  EKG: ST elevations V2-V3  Treatment:  heparin 4000U IV x1 then heparin gtt, metoprolol tartrate 5mg IV x1, ticagrelor 180 mg PO x1, nitroglycerin 0.4 mg subLingual x1  Cardiology consulted in the ED and sent to the Cath lab.     Cath reports L dominant,. LM normal. % thrombotic . LVEF 35-40% mod Ant wall hypokinesis, Successful thrombectomy +PCI on LAD DAVIS.     On CCU arrival: Post cath he was calm in no acute distress, denies HA, SOB, CP, abdominal pain. Right wrist access TR band.   V/S: HR 80, bp 134/84, RR 26,  Sat 97%  ECG post cath: DESTINY on V2-V3 and T inversion in V2-V5      PMHx as above  PSH: none  Allergy: None  FH: MOM and Dad both DMT2 and dementia   SH: Denies ETOH, smoking cigarettes and drug use  Medication: Denies taking any home medications. Stopped taking Metformin 3-4 years ago

## 2021-06-18 ENCOUNTER — TRANSCRIPTION ENCOUNTER (OUTPATIENT)
Age: 51
End: 2021-06-18

## 2021-06-18 DIAGNOSIS — E11.9 TYPE 2 DIABETES MELLITUS WITHOUT COMPLICATIONS: ICD-10-CM

## 2021-06-18 DIAGNOSIS — R63.8 OTHER SYMPTOMS AND SIGNS CONCERNING FOOD AND FLUID INTAKE: ICD-10-CM

## 2021-06-18 DIAGNOSIS — I21.3 ST ELEVATION (STEMI) MYOCARDIAL INFARCTION OF UNSPECIFIED SITE: ICD-10-CM

## 2021-06-18 DIAGNOSIS — I50.21 ACUTE SYSTOLIC (CONGESTIVE) HEART FAILURE: ICD-10-CM

## 2021-06-18 DIAGNOSIS — L71.9 ROSACEA, UNSPECIFIED: ICD-10-CM

## 2021-06-18 LAB
ALBUMIN SERPL ELPH-MCNC: 3.5 G/DL — SIGNIFICANT CHANGE UP (ref 3.3–5)
ALP SERPL-CCNC: 66 U/L — SIGNIFICANT CHANGE UP (ref 40–120)
ALT FLD-CCNC: 38 U/L — SIGNIFICANT CHANGE UP (ref 10–45)
ANION GAP SERPL CALC-SCNC: 13 MMOL/L — SIGNIFICANT CHANGE UP (ref 5–17)
APPEARANCE UR: CLEAR — SIGNIFICANT CHANGE UP
AST SERPL-CCNC: 104 U/L — HIGH (ref 10–40)
BACTERIA # UR AUTO: PRESENT /HPF
BASOPHILS # BLD AUTO: 0.03 K/UL — SIGNIFICANT CHANGE UP (ref 0–0.2)
BASOPHILS NFR BLD AUTO: 0.3 % — SIGNIFICANT CHANGE UP (ref 0–2)
BILIRUB SERPL-MCNC: 1.5 MG/DL — HIGH (ref 0.2–1.2)
BILIRUB UR-MCNC: NEGATIVE — SIGNIFICANT CHANGE UP
BUN SERPL-MCNC: 10 MG/DL — SIGNIFICANT CHANGE UP (ref 7–23)
CALCIUM SERPL-MCNC: 8.8 MG/DL — SIGNIFICANT CHANGE UP (ref 8.4–10.5)
CHLORIDE SERPL-SCNC: 103 MMOL/L — SIGNIFICANT CHANGE UP (ref 96–108)
CK MB CFR SERPL CALC: 38.8 NG/ML — HIGH (ref 0–6.7)
CK MB CFR SERPL CALC: 56.7 NG/ML — HIGH (ref 0–6.7)
CK SERPL-CCNC: 1275 U/L — HIGH (ref 30–200)
CK SERPL-CCNC: 966 U/L — HIGH (ref 30–200)
CO2 SERPL-SCNC: 21 MMOL/L — LOW (ref 22–31)
COLOR SPEC: YELLOW — SIGNIFICANT CHANGE UP
COVID-19 SPIKE DOMAIN AB INTERP: POSITIVE
COVID-19 SPIKE DOMAIN ANTIBODY RESULT: >250 U/ML — HIGH
CREAT SERPL-MCNC: 0.73 MG/DL — SIGNIFICANT CHANGE UP (ref 0.5–1.3)
DIFF PNL FLD: ABNORMAL
EOSINOPHIL # BLD AUTO: 0.05 K/UL — SIGNIFICANT CHANGE UP (ref 0–0.5)
EOSINOPHIL NFR BLD AUTO: 0.5 % — SIGNIFICANT CHANGE UP (ref 0–6)
EPI CELLS # UR: SIGNIFICANT CHANGE UP /HPF (ref 0–5)
GLUCOSE BLDC GLUCOMTR-MCNC: 186 MG/DL — HIGH (ref 70–99)
GLUCOSE BLDC GLUCOMTR-MCNC: 199 MG/DL — HIGH (ref 70–99)
GLUCOSE BLDC GLUCOMTR-MCNC: 210 MG/DL — HIGH (ref 70–99)
GLUCOSE BLDC GLUCOMTR-MCNC: 216 MG/DL — HIGH (ref 70–99)
GLUCOSE SERPL-MCNC: 223 MG/DL — HIGH (ref 70–99)
GLUCOSE UR QL: 100
HCT VFR BLD CALC: 43 % — SIGNIFICANT CHANGE UP (ref 39–50)
HGB BLD-MCNC: 14.9 G/DL — SIGNIFICANT CHANGE UP (ref 13–17)
HYALINE CASTS # UR AUTO: SIGNIFICANT CHANGE UP /LPF (ref 0–2)
IMM GRANULOCYTES NFR BLD AUTO: 0.7 % — SIGNIFICANT CHANGE UP (ref 0–1.5)
KETONES UR-MCNC: 40 MG/DL
LEUKOCYTE ESTERASE UR-ACNC: NEGATIVE — SIGNIFICANT CHANGE UP
LYMPHOCYTES # BLD AUTO: 1.68 K/UL — SIGNIFICANT CHANGE UP (ref 1–3.3)
LYMPHOCYTES # BLD AUTO: 17.1 % — SIGNIFICANT CHANGE UP (ref 13–44)
MAGNESIUM SERPL-MCNC: 2 MG/DL — SIGNIFICANT CHANGE UP (ref 1.6–2.6)
MCHC RBC-ENTMCNC: 28.9 PG — SIGNIFICANT CHANGE UP (ref 27–34)
MCHC RBC-ENTMCNC: 34.7 GM/DL — SIGNIFICANT CHANGE UP (ref 32–36)
MCV RBC AUTO: 83.3 FL — SIGNIFICANT CHANGE UP (ref 80–100)
MONOCYTES # BLD AUTO: 1.11 K/UL — HIGH (ref 0–0.9)
MONOCYTES NFR BLD AUTO: 11.3 % — SIGNIFICANT CHANGE UP (ref 2–14)
NEUTROPHILS # BLD AUTO: 6.91 K/UL — SIGNIFICANT CHANGE UP (ref 1.8–7.4)
NEUTROPHILS NFR BLD AUTO: 70.1 % — SIGNIFICANT CHANGE UP (ref 43–77)
NITRITE UR-MCNC: NEGATIVE — SIGNIFICANT CHANGE UP
NRBC # BLD: 0 /100 WBCS — SIGNIFICANT CHANGE UP (ref 0–0)
PH UR: 7.5 — SIGNIFICANT CHANGE UP (ref 5–8)
PHOSPHATE SERPL-MCNC: 2.8 MG/DL — SIGNIFICANT CHANGE UP (ref 2.5–4.5)
PLATELET # BLD AUTO: 212 K/UL — SIGNIFICANT CHANGE UP (ref 150–400)
POTASSIUM SERPL-MCNC: 3.7 MMOL/L — SIGNIFICANT CHANGE UP (ref 3.5–5.3)
POTASSIUM SERPL-SCNC: 3.7 MMOL/L — SIGNIFICANT CHANGE UP (ref 3.5–5.3)
PROT SERPL-MCNC: 6.8 G/DL — SIGNIFICANT CHANGE UP (ref 6–8.3)
PROT UR-MCNC: 100 MG/DL
RBC # BLD: 5.16 M/UL — SIGNIFICANT CHANGE UP (ref 4.2–5.8)
RBC # FLD: 13 % — SIGNIFICANT CHANGE UP (ref 10.3–14.5)
RBC CASTS # UR COMP ASSIST: < 5 /HPF — SIGNIFICANT CHANGE UP
SARS-COV-2 IGG+IGM SERPL QL IA: >250 U/ML — HIGH
SARS-COV-2 IGG+IGM SERPL QL IA: POSITIVE
SODIUM SERPL-SCNC: 137 MMOL/L — SIGNIFICANT CHANGE UP (ref 135–145)
SP GR SPEC: 1.01 — SIGNIFICANT CHANGE UP (ref 1–1.03)
TROPONIN T SERPL-MCNC: 1.58 NG/ML — CRITICAL HIGH (ref 0–0.01)
TROPONIN T SERPL-MCNC: 2.19 NG/ML — CRITICAL HIGH (ref 0–0.01)
UROBILINOGEN FLD QL: 0.2 E.U./DL — SIGNIFICANT CHANGE UP
WBC # BLD: 9.85 K/UL — SIGNIFICANT CHANGE UP (ref 3.8–10.5)
WBC # FLD AUTO: 9.85 K/UL — SIGNIFICANT CHANGE UP (ref 3.8–10.5)
WBC UR QL: < 5 /HPF — SIGNIFICANT CHANGE UP

## 2021-06-18 PROCEDURE — 71045 X-RAY EXAM CHEST 1 VIEW: CPT | Mod: 26

## 2021-06-18 PROCEDURE — 99291 CRITICAL CARE FIRST HOUR: CPT

## 2021-06-18 PROCEDURE — 93306 TTE W/DOPPLER COMPLETE: CPT | Mod: 26

## 2021-06-18 PROCEDURE — 93010 ELECTROCARDIOGRAM REPORT: CPT

## 2021-06-18 RX ORDER — METOPROLOL TARTRATE 50 MG
25 TABLET ORAL DAILY
Refills: 0 | Status: DISCONTINUED | OUTPATIENT
Start: 2021-06-19 | End: 2021-06-19

## 2021-06-18 RX ORDER — POTASSIUM CHLORIDE 20 MEQ
20 PACKET (EA) ORAL ONCE
Refills: 0 | Status: COMPLETED | OUTPATIENT
Start: 2021-06-18 | End: 2021-06-18

## 2021-06-18 RX ORDER — TICAGRELOR 90 MG/1
1 TABLET ORAL
Qty: 60 | Refills: 5
Start: 2021-06-18 | End: 2021-12-14

## 2021-06-18 RX ORDER — ENOXAPARIN SODIUM 100 MG/ML
40 INJECTION SUBCUTANEOUS EVERY 24 HOURS
Refills: 0 | Status: DISCONTINUED | OUTPATIENT
Start: 2021-06-18 | End: 2021-06-19

## 2021-06-18 RX ORDER — METOPROLOL TARTRATE 50 MG
25 TABLET ORAL DAILY
Refills: 0 | Status: DISCONTINUED | OUTPATIENT
Start: 2021-06-18 | End: 2021-06-18

## 2021-06-18 RX ORDER — LOSARTAN POTASSIUM 100 MG/1
25 TABLET, FILM COATED ORAL DAILY
Refills: 0 | Status: DISCONTINUED | OUTPATIENT
Start: 2021-06-18 | End: 2021-06-19

## 2021-06-18 RX ORDER — METOPROLOL TARTRATE 50 MG
12.5 TABLET ORAL ONCE
Refills: 0 | Status: DISCONTINUED | OUTPATIENT
Start: 2021-06-19 | End: 2021-06-19

## 2021-06-18 RX ADMIN — Medication 20 MILLIEQUIVALENT(S): at 06:35

## 2021-06-18 RX ADMIN — Medication 81 MILLIGRAM(S): at 11:29

## 2021-06-18 RX ADMIN — Medication 4: at 11:28

## 2021-06-18 RX ADMIN — TICAGRELOR 90 MILLIGRAM(S): 90 TABLET ORAL at 06:07

## 2021-06-18 RX ADMIN — Medication 4: at 06:35

## 2021-06-18 RX ADMIN — TICAGRELOR 90 MILLIGRAM(S): 90 TABLET ORAL at 17:21

## 2021-06-18 RX ADMIN — ENOXAPARIN SODIUM 40 MILLIGRAM(S): 100 INJECTION SUBCUTANEOUS at 10:49

## 2021-06-18 RX ADMIN — ATORVASTATIN CALCIUM 80 MILLIGRAM(S): 80 TABLET, FILM COATED ORAL at 21:03

## 2021-06-18 RX ADMIN — LOSARTAN POTASSIUM 25 MILLIGRAM(S): 100 TABLET, FILM COATED ORAL at 09:14

## 2021-06-18 RX ADMIN — Medication 12.5 MILLIGRAM(S): at 06:07

## 2021-06-18 NOTE — PROGRESS NOTE ADULT - ATTENDING COMMENTS
Presented to the ED with pressure like exertional chest pain for 2 weeks relived by rest, found ST elevations in V2-V3. diagnosed with STEMI and s/p cath received one DAVIS on %.  CATH report 6/17:  L dominant. LM normal. % thrombotic . LVEF 35-40% mod Ant wall hypokinesis, Successful thrombectomy +PCI on LAD DAVIS   -S/p Brillinta and ASA load in the ED, s/p hep drip prior PCI  -C/w ASA 81 mg daily  -C/w Brilinta 90mg BID  -C/w Atorvastatin 80mg bedtime  -C/w Lopressor 12.5 BID   -C/w Losartan 25mg daily - will f/u with pt pharmacy if covered for Entresto.  -F/u official TTE  -Trop up trending post cath, 0.67 -> 2.19; CKMB downtrending 64.8 -> 56.7; expected post PCI with reperfusion f/u trop q6 to pick.       Risk factors:  - ASCVD with 10yr risk for stroke of 9.1% recommended high intensity of statin   - Lipid panel: HDL 39, DUW379, TG 71  - HA1c 8.9  - TSH 1.6    Ambulate on tele    I have personally provided 45 minutes of critical care time concurrently with the resident and  fellow.  This time excludes time spent on separate procedures and time spent teaching. I have reviewed the resident’s  documentation and I agree with the resident’s assessment and plan of care.  ILYA Trevino

## 2021-06-18 NOTE — PROGRESS NOTE ADULT - PROBLEM SELECTOR PLAN 4
Usually flare up with the body temperature  -Use metronidazole? cream at home sometimes PRN     #Fever: most likely post-procedural  - Fever 101.3, 2 sets blood cultures collected, not starting ABx as patient not septic, WBC resolved

## 2021-06-18 NOTE — DISCHARGE NOTE PROVIDER - HOSPITAL COURSE
Patient is __ yo M/F with past medical history of _____  Presented with _____, found to have _____  Problem List/Main Diagnoses (system-based):   Inpatient treatment course:   New medications:   Labs to be followed outpatient:   Exam to be followed outpatient:    INCOMPLETE    51 Y M with PMHX DMT2 on no medications presented to the ED with chest pain for 2 weeks, Admitted for STEMI, s/p DAVIS  %, EF 35-40% on 6/17 transferred to the CCU for post CATH monitoring.     Problem List/Main Diagnoses (system-based):   #STEMI (ST elevation myocardial infarction).  Plan: Presented to the ED with pressure like exertional chest pain for 2 weeks relived by rest, found ST elevations in V2-V3. diagnosed with STEMI and s/p cath received one DAVIS on %.  CATH report 6/17:  L dominant. LM normal. % thrombotic . LVEF 35-40% mod Ant wall hypokinesis, Successful thrombectomy +PCI on LAD DAVIS   -S/p Brillinta and ASA load in the ED, s/p hep drip prior PCI  -C/w ASA 81 mg daily, Brilinta 90mg BID, Atorvastatin 80mg bedtime, Lopressor 12.5 BID   -C/w Losartan 25mg daily - will f/u with pt pharmacy if covered for Entresto.  - TTE 6/18: Mild symmetric left ventricular hypertrophy. Moderate segmental left ventricular systolic dysfunction with severe hypokineisis of mid anteroseptum, mid inferoseptum, apical septum, apical inferior, apex proper, and apical anterior. LVEF is 35%. Grade I left ventricular diastolic dysfunction.    #CAD  - Management as above.    # Acute HFrEF (heart failure with reduced ejection fraction).  Plan: No history, new onset. LVEF 35-40% Likely acute reduced in setting of STEMI  -C/w Lopressor 12.5 BID   -C/w Losartan 25mg daily - will f/u with pt pharmacy if covered for Entresto.  - Outpatient cardiology follow up.     #DM (diabetes mellitus).  Plan: on no medication. FH of DMT2 on both parents.   -HA1c 8.9  -Consider start metformin 500mg BID after dc.     #Rosacea.  Plan: Usually flare up with the body temperature  - f/u outpatient    Inpatient treatment course:     New medications:   Labs to be followed outpatient:   Exam to be followed outpatient:       Dx: Heart Failure, Unstable Angina/ACS/NSTEMI/STEMI this Admit or History of Heart Failure, : YES           Cardiac Rehab (STEMI/NSTEMI/ACS/Unstable Angina/CHF/Post PCI):            *Education on benefits of Cardiac Rehab provided to patient: Yes         *Referral and Prescription Given for Cardiac Rehab: Yes/         *Pt given list of locations & instructed to contact their insurance company to review list of participating providers. Yes         *Pt instructed to bring Cardiac Rehab prescription with them to Cardiology Follow up appointment for assistance with enrollment: Yes    AMI: Beta Blocker Prescribed: Yes            ACE-I/ARB Prescribed: Yes  CHF: Beta Blocker Prescribed: Yes           ACE-I/ARB/Entresto Prescribed: Yes  Statin Prescribed (STEMI/NSTEMI/ACS/UA &/OR Post PCI this admission):  Yes  DAPT: Prescriptions for Aspirin/Brilinta e-prescribed to patient's pharmacy: Yes                 INCOMPLETE    51 Y M with PMHX DMT2 on no medications presented to the ED with chest pain for 2 weeks, Admitted for STEMI, s/p DAVIS  %, EF 35-40% on 6/17 transferred to the CCU for post CATH monitoring.     Problem List/Main Diagnoses (system-based):   #STEMI (ST elevation myocardial infarction).  Plan: Presented to the ED with pressure like exertional chest pain for 2 weeks relived by rest, found ST elevations in V2-V3. diagnosed with STEMI and s/p cath received one DAVIS on %.  CATH report 6/17:  L dominant. LM normal. % thrombotic . LVEF 35-40% mod Ant wall hypokinesis, Successful thrombectomy +PCI on LAD DAVIS   -S/p Brillinta and ASA load in the ED, s/p hep drip prior PCI  -C/w ASA 81 mg daily, Brilinta 90mg BID, Atorvastatin 80mg bedtime, Toprol XL,    - TTE 6/18: Mild symmetric left ventricular hypertrophy. Moderate segmental left ventricular systolic dysfunction with severe hypokineisis of mid anteroseptum, mid inferoseptum, apical septum, apical inferior, apex proper, and apical anterior. LVEF is 35%. Grade I left ventricular diastolic dysfunction.    #CAD  - Management as above.    # Acute HFrEF (heart failure with reduced ejection fraction).  Plan: No history, new onset. LVEF 35-40% Likely acute reduced in setting of STEMI  -Continue with Toprol  - Outpatient cardiology follow up.     #DM (diabetes mellitus).  Plan: on no medication. FH of DMT2 on both parents.   -HA1c 8.9  -Consider start metformin 500mg BID after dc.     #Rosacea.  Plan: Usually flare up with the body temperature  - f/u outpatient    Inpatient treatment course:     New medications:   Labs to be followed outpatient:   Exam to be followed outpatient:       Dx: Heart Failure, Unstable Angina/ACS/NSTEMI/STEMI this Admit or History of Heart Failure, : YES           Cardiac Rehab (STEMI/NSTEMI/ACS/Unstable Angina/CHF/Post PCI):            *Education on benefits of Cardiac Rehab provided to patient: Yes         *Referral and Prescription Given for Cardiac Rehab: Yes/         *Pt given list of locations & instructed to contact their insurance company to review list of participating providers. Yes         *Pt instructed to bring Cardiac Rehab prescription with them to Cardiology Follow up appointment for assistance with enrollment: Yes    AMI: Beta Blocker Prescribed: Yes            ACE-I/ARB Prescribed: Yes  CHF: Beta Blocker Prescribed: Yes           ACE-I/ARB/Entresto Prescribed: Yes  Statin Prescribed (STEMI/NSTEMI/ACS/UA &/OR Post PCI this admission):  Yes  DAPT: Prescriptions for Aspirin/Brilinta e-prescribed to patient's pharmacy: Yes

## 2021-06-18 NOTE — PROGRESS NOTE ADULT - SUBJECTIVE AND OBJECTIVE BOX
**INCOMPLETE NOTE    OVERNIGHT EVENTS:    SUBJECTIVE:  Patient seen and examined at bedside.    Vital Signs Last 12 Hrs  T(F): 99.3 (21 @ 09:06), Max: 99.3 (21 @ 09:06)  HR: 70 (21 @ 10:00) (59 - 87)  BP: 131/72 (21 @ 10:00) (118/81 - 151/74)  BP(mean): 96 (21 @ 10:00) (90 - 104)  RR: 21 (21 @ 10:00) (18 - 31)  SpO2: 95% (21 @ 10:00) (93% - 97%)  I&O's Summary    2021 07:01  -  2021 07:00  --------------------------------------------------------  IN: 320 mL / OUT: 600 mL / NET: -280 mL    2021 07:01  -  2021 11:08  --------------------------------------------------------  IN: 180 mL / OUT: 0 mL / NET: 180 mL        PHYSICAL EXAM:  Constitutional: NAD, comfortable in bed.  HEENT: NC/AT, PERRLA, EOMI, no conjunctival pallor or scleral icterus, MMM  Neck: Supple, no JVD  Respiratory: CTA B/L. No w/r/r.   Cardiovascular: RRR, normal S1 and S2, no m/r/g.   Gastrointestinal: +BS, soft NTND, no guarding or rebound tenderness, no palpable masses   Extremities: wwp; no cyanosis, clubbing or edema.   Vascular: Pulses equal and strong throughout.   Neurological: AAOx3, no CN deficits, strength and sensation intact throughout.   Skin: No gross skin abnormalities or rashes        LABS:                        14.9   9.85  )-----------( 212      ( 2021 05:38 )             43.0     06-18    137  |  103  |  10  ----------------------------<  223<H>  3.7   |  21<L>  |  0.73    Ca    8.8      2021 05:38  Phos  2.8     -18  Mg     2.0     -18    TPro  6.8  /  Alb  3.5  /  TBili  1.5<H>  /  DBili  x   /  AST  104<H>  /  ALT  38  /  AlkPhos  66  06-18    PT/INR - ( 2021 18:23 )   PT: 13.5 sec;   INR: 1.13          PTT - ( 2021 20:55 )  PTT:170.8 sec  Urinalysis Basic - ( 2021 00:26 )    Color: Yellow / Appearance: Clear / S.010 / pH: x  Gluc: x / Ketone: 40 mg/dL  / Bili: Negative / Urobili: 0.2 E.U./dL   Blood: x / Protein: 100 mg/dL / Nitrite: NEGATIVE   Leuk Esterase: NEGATIVE / RBC: < 5 /HPF / WBC < 5 /HPF   Sq Epi: x / Non Sq Epi: 0-5 /HPF / Bacteria: Present /HPF          RADIOLOGY & ADDITIONAL TESTS:    MEDICATIONS  (STANDING):  aspirin enteric coated 81 milliGRAM(s) Oral daily  atorvastatin 80 milliGRAM(s) Oral at bedtime  dextrose 40% Gel 15 Gram(s) Oral once  dextrose 5%. 1000 milliLiter(s) (50 mL/Hr) IV Continuous <Continuous>  dextrose 50% Injectable 25 Gram(s) IV Push once  enoxaparin Injectable 40 milliGRAM(s) SubCutaneous every 24 hours  glucagon  Injectable 1 milliGRAM(s) IntraMuscular once  insulin lispro (ADMELOG) corrective regimen sliding scale   SubCutaneous three times a day before meals  insulin lispro (ADMELOG) corrective regimen sliding scale   SubCutaneous at bedtime  losartan 25 milliGRAM(s) Oral daily  metoprolol tartrate 12.5 milliGRAM(s) Oral two times a day  ticagrelor 90 milliGRAM(s) Oral every 12 hours    MEDICATIONS  (PRN):  acetaminophen   Tablet .. 650 milliGRAM(s) Oral every 6 hours PRN Temp greater or equal to 38C (100.4F)  nitroglycerin     SubLingual 0.4 milliGRAM(s) SubLingual every 5 minutes PRN Chest Pain   ** Transfer Note From CCU To Cardiology **    HPI:  51 Y M with PMHX DMT2 on no medications (used to be on metformin 1000BID stopped 3-4 years ago) presented to the ED with intermittent chest pain for 2 weeks that worsen and become persistent at rest on day of admission. Admitted for STEMI (anterior leads), s/p DAVIS to % with a thrombectomy on , TTE on  with  EF 35-40%, transferred to the CCU for post CATH monitoring. On repeat EKG post cath diffuse ST elevation V1-V2 (no TX depressions) and TWI.    OVERNIGHT EVENTS: TR band terri removed, w/ good pulses, no signs of hematoma. Patient with Tmax of 101.3 poet PCI, cultures obtained, no WBC/ signs of infection. Not started on ABx.      SUBJECTIVE:  Patient seen and examined at bedside. Patient's chest pain has resolved, Patient denies h/n/v/d, fever, chills, cp, palpitations, sob, abd pain, leg swelling, rashes, dysuria, and changes in BM.      Vital Signs Last 12 Hrs  T(F): 99.3 (21 @ 09:06), Max: 99.3 (21 @ 09:06)  HR: 70 (21 @ 10:00) (59 - 87)  BP: 131/72 (21 @ 10:00) (118/81 - 151/74)  BP(mean): 96 (21 @ 10:00) (90 - 104)  RR: 21 (21 @ 10:00) (18 - 31)  SpO2: 95% (21 @ 10:00) (93% - 97%)  I&O's Summary    2021 07:  -  2021 07:00  --------------------------------------------------------  IN: 320 mL / OUT: 600 mL / NET: -280 mL    2021 07:  -  2021 11:08  --------------------------------------------------------  IN: 180 mL / OUT: 0 mL / NET: 180 mL    PHYSICAL EXAM:  Constitutional: NAD, comfortable in bed.  HEENT: NC/AT, PERRLA, EOMI, no conjunctival pallor or scleral icterus, MMM  Neck: Supple, no JVD  Respiratory: CTA B/L. No w/r/r.   Cardiovascular: RRR, normal S1 and S2, no m/r/g.   Gastrointestinal: +BS, soft NTND, no guarding or rebound tenderness, no palpable masses   Extremities: wwp; no cyanosis, clubbing or edema. R. wrist w/ good pulses, no signs of hematoma.   Vascular: Pulses equal and strong throughout.   Neurological: AAOx3, no CN deficits, strength and sensation intact throughout.   Skin: No gross skin abnormalities or rashes    LABS:                        14.9   9.85  )-----------( 212      ( 2021 05:38 )             43.0     06-18    137  |  103  |  10  ----------------------------<  223<H>  3.7   |  21<L>  |  0.73    Ca    8.8      2021 05:38  Phos  2.8     06-18  Mg     2.0     06-18    TPro  6.8  /  Alb  3.5  /  TBili  1.5<H>  /  DBili  x   /  AST  104<H>  /  ALT  38  /  AlkPhos  66  06-18    PT/INR - ( 2021 18:23 )   PT: 13.5 sec;   INR: 1.13          PTT - ( 2021 20:55 )  PTT:170.8 sec  Urinalysis Basic - ( 2021 00:26 )    Color: Yellow / Appearance: Clear / S.010 / pH: x  Gluc: x / Ketone: 40 mg/dL  / Bili: Negative / Urobili: 0.2 E.U./dL   Blood: x / Protein: 100 mg/dL / Nitrite: NEGATIVE   Leuk Esterase: NEGATIVE / RBC: < 5 /HPF / WBC < 5 /HPF   Sq Epi: x / Non Sq Epi: 0-5 /HPF / Bacteria: Present /HPF    MEDICATIONS  (STANDING):  aspirin enteric coated 81 milliGRAM(s) Oral daily  atorvastatin 80 milliGRAM(s) Oral at bedtime  dextrose 40% Gel 15 Gram(s) Oral once  dextrose 5%. 1000 milliLiter(s) (50 mL/Hr) IV Continuous <Continuous>  dextrose 50% Injectable 25 Gram(s) IV Push once  enoxaparin Injectable 40 milliGRAM(s) SubCutaneous every 24 hours  glucagon  Injectable 1 milliGRAM(s) IntraMuscular once  insulin lispro (ADMELOG) corrective regimen sliding scale   SubCutaneous three times a day before meals  insulin lispro (ADMELOG) corrective regimen sliding scale   SubCutaneous at bedtime  losartan 25 milliGRAM(s) Oral daily  metoprolol tartrate 12.5 milliGRAM(s) Oral two times a day  ticagrelor 90 milliGRAM(s) Oral every 12 hours    MEDICATIONS  (PRN):  acetaminophen   Tablet .. 650 milliGRAM(s) Oral every 6 hours PRN Temp greater or equal to 38C (100.4F)  nitroglycerin     SubLingual 0.4 milliGRAM(s) SubLingual every 5 minutes PRN Chest Pain

## 2021-06-18 NOTE — PROGRESS NOTE ADULT - ASSESSMENT
51 Y M with PMHX DMT2 on no medications presented to the ED with chest pain for 2 weeks, Admitted for STEMI, s/p DAVIS  %, EF 35-40% on 6/17 transferred to the CCU for post CATH monitoring.         Problem/Plan     NEURO:  No active issues.    CARDIOVASCULAR:  #STEMI  Presented to the ED with pressure like exertional chest pain for 2 weeks relived by rest, found ST elevations in V2-V3. diagnosed with STEMI and s/p cath received one DAVIS on %.  CATH report 6/17:  L dominant. LM normal. % thrombotic . LVEF 35-40% mod Ant wall hypokinesis, Successful thrombectomy +PCI on LAD DAVIS   -S/p Loaded Brillinta and ASA in the ED for cath  -C/w ASA 81 mg daily  -C/w Brilinta 90mg BID start AM   - Lipid panel: HDL 39, OIP370, TG 71  - HA1c 8.9  - TSH 1.6  - Off from heparin gtt  -ASCVD 9.1% recommended high intensity of statin   - C/w Atorvastatin 80mg bedtime  -Started on Lopressor 12.5 BID   -Plan for start ACEI/ARB tomorrow       #HFrEF   LVEF 35-40% Likely acute reduced in setting of STEMI  -Started on Lopressor 12.5 BID   -Plan for start ACEI/ARB tomorrow   -F/u TTE in AM  - Consider repeat TTE after dc for re-evaluate the EF, plan with outpatient cardiology       #Rhythm  NSR HR controlled      #Volume  Euvolemic     PULM:  No active issue, Satting well on Ra    GI:  No active issue,    RENAL:  No active issue     ENDO:   PMHx of DMT2 on no medication. FH of DMT2 on both parents.   -  -HA1c 8.9  -Started on mISS   -Consider start metformin after dc     ID:   No active issue,    HEME/ONC:  Leukocytoses likely reactive       SKIN  #Rosacea   Usually flare up with the body temperature  -Use metronidazole? cream at home sometimes PRN     PREVENTIVE:   Fluids: s/p 1L NS bolus   E: Replete as needed keep K>4, Mg >2   Diet: DASH/TLC + Consistent carbohydrate   DVT ppx: SCDs for now. Will start Lovenox tomorrow.   GI ppx: None  CODE: FULL  Disp: CCU    51 Y M with PMHX DMT2 on no medications presented to the ED with chest pain for 2 weeks, Admitted for STEMI, s/p DAVIS  %, EF 35-40% on 6/17 transferred to the CCU for post CATH monitoring.     Problem/Plan     NEURO:  No active issues.    CARDIOVASCULAR:  #STEMI  Presented to the ED with pressure like exertional chest pain for 2 weeks relived by rest, found ST elevations in V2-V3. diagnosed with STEMI and s/p cath received one DAVIS on %.  CATH report 6/17:  L dominant. LM normal. % thrombotic . LVEF 35-40% mod Ant wall hypokinesis, Successful thrombectomy +PCI on LAD DAVIS   -S/p Brillinta and ASA load in the ED, s/p hep drip prior PCI  -C/w ASA 81 mg daily  -C/w Brilinta 90mg BID  -C/w Atorvastatin 80mg bedtime  -C/w Lopressor 12.5 BID   -C/w Losartan 25mg daily - will f/u with pt pharmacy if covered for Entresto.  -F/u official TTE  -Trop up trending post cath, 0.67 -> 2.19; CKMB downtrending 64.8 -> 56.7; expected post PCI with reperfusion f/u trop q6 to pick.       Risk factors:  - ASCVD with 10yr risk for stroke of 9.1% recommended high intensity of statin   - Lipid panel: HDL 39, SVF188, TG 71  - HA1c 8.9  - TSH 1.6    #HFrEF   LVEF 35-40% Likely acute reduced in setting of STEMI  -C/w Lopressor 12.5 BID   -C/w Losartan 25mg daily - will f/u with pt pharmacy if covered for Entresto.  -F/u official TTE  -Outpatient cardiology follow up and with prevention cardiolog    PULM:  No active issue    GI:  No active issue    RENAL:  No active issue     ENDO:   PMHx of DMT2 on no medication. FH of DMT2 on both parents.   -HA1c 8.9  -mISS   -Consider start metformin 500mg BID after dc     ID:   No active issue  Fever 101.3, 2 sets blood cultures collected, not starting ABx as patient not septic, WBC resolved. m/p procedure related.    HEME/ONC:  Leukocytoses - resolved    SKIN  #Rosacea   Usually flare up with the body temperature  -Use metronidazole? cream at home sometimes PRN     PREVENTIVE:   Fluids: s/p 1L NS bolus   E: Replete as needed keep K>4, Mg >2   Diet: DASH/TLC + Consistent carbohydrate   DVT ppx: SCDs for now. Will start Lovenox tomorrow.   GI ppx: None  CODE: FULL  Disp: CCU to cardiology

## 2021-06-18 NOTE — DISCHARGE NOTE PROVIDER - PROVIDER TOKENS
PROVIDER:[TOKEN:[[id number]]] PROVIDER:[[id number]],SCHEDULEDAPPT:[06/30/2021]] PROVIDER:[TOKEN:[[id number]],SCHEDULEDAPPT:[06/30/2021],SCHEDULEDAPPTTIME:[01:30 PM]],PROVIDER:[TOKEN:[[id number]]] PROVIDER:[TOKEN:[[id number]],SCHEDULEDAPPT:[06/30/2021],SCHEDULEDAPPTTIME:[01:30 PM]],PROVIDER:[[id number]],SCHEDULEDAPPT:[06/24/2021],SCHEDULEDAPPTTIME:[11:30 AM]]

## 2021-06-18 NOTE — PROGRESS NOTE ADULT - SUBJECTIVE AND OBJECTIVE BOX
TRANSFER NOTE CCU to     Hospital Course:  51 Y M with PMHX DMT2 on no medications (used to be on metformin 1000BID stopped 3-4 years ago) presented to the ED with intermittent chest pain for 2 weeks that worsen and become persistent at rest on day of admission. Admitted for STEMI (anterior leads), s/p DAVIS to % with a thrombectomy on , TTE on  with  EF 35-40%, transferred to the CCU for post CATH monitoring. On repeat EKG post cath diffuse ST elevation V1-V2 (no ND depressions) and TWI.    Subjective/ROS:     Denies HA, CP, SOB, n/v, changes in bowel/urinary habits.  12pt ROS otherwise negative.    Family History, Social History, Past Medical History, and Home Medications from admission H&P were reviewed and are unchanged unless noted below.     VITALS  Vital Signs Last 24 Hrs  T(C): 37.4 (2021 09:06), Max: 38.5 (2021 21:57)  T(F): 99.3 (2021 09:06), Max: 101.3 (2021 21:57)  HR: 65 (2021 11:00) (59 - 120)  BP: 121/68 (2021 11:00) (118/81 - 157/93)  BP(mean): 89 (2021 11:00) (89 - 105)  RR: 19 (2021 11:00) (16 - 31)  SpO2: 96% (2021 11:00) (93% - 98%)    CAPILLARY BLOOD GLUCOSE      POCT Blood Glucose.: 216 mg/dL (2021 11:15)  POCT Blood Glucose.: 210 mg/dL (2021 06:12)  POCT Blood Glucose.: 201 mg/dL (2021 21:37)      PHYSICAL EXAM  General: A&Ox3; NAD  Head: NC/AT; MMM; PERRL; EOMI;  Neck: Supple; no JVD  Respiratory: CTA B/L; no wheezes/crackles   Cardiovascular: Regular rhythm/rate; S1/S2   Gastrointestinal: Soft; NTND; normoactive BS  Extremities: WWP; no edema/cyanosis  Neurological:  CNII-XII grossly intact; no obvious focal deficits    MEDICATIONS  (STANDING):  aspirin enteric coated 81 milliGRAM(s) Oral daily  atorvastatin 80 milliGRAM(s) Oral at bedtime  dextrose 40% Gel 15 Gram(s) Oral once  dextrose 5%. 1000 milliLiter(s) (50 mL/Hr) IV Continuous <Continuous>  dextrose 50% Injectable 25 Gram(s) IV Push once  enoxaparin Injectable 40 milliGRAM(s) SubCutaneous every 24 hours  glucagon  Injectable 1 milliGRAM(s) IntraMuscular once  insulin lispro (ADMELOG) corrective regimen sliding scale   SubCutaneous three times a day before meals  insulin lispro (ADMELOG) corrective regimen sliding scale   SubCutaneous at bedtime  losartan 25 milliGRAM(s) Oral daily  metoprolol tartrate 12.5 milliGRAM(s) Oral two times a day  ticagrelor 90 milliGRAM(s) Oral every 12 hours    MEDICATIONS  (PRN):  acetaminophen   Tablet .. 650 milliGRAM(s) Oral every 6 hours PRN Temp greater or equal to 38C (100.4F)  nitroglycerin     SubLingual 0.4 milliGRAM(s) SubLingual every 5 minutes PRN Chest Pain      No Known Allergies      LABS                        14.9   9.85  )-----------( 212      ( 2021 05:38 )             43.0     06-18    137  |  103  |  10  ----------------------------<  223<H>  3.7   |  21<L>  |  0.73    Ca    8.8      2021 05:38  Phos  2.8     06-18  Mg     2.0     06-18    TPro  6.8  /  Alb  3.5  /  TBili  1.5<H>  /  DBili  x   /  AST  104<H>  /  ALT  38  /  AlkPhos  66  06-18    PT/INR - ( 2021 18:23 )   PT: 13.5 sec;   INR: 1.13          PTT - ( 2021 20:55 )  PTT:170.8 sec  Urinalysis Basic - ( 2021 00:26 )    Color: Yellow / Appearance: Clear / S.010 / pH: x  Gluc: x / Ketone: 40 mg/dL  / Bili: Negative / Urobili: 0.2 E.U./dL   Blood: x / Protein: 100 mg/dL / Nitrite: NEGATIVE   Leuk Esterase: NEGATIVE / RBC: < 5 /HPF / WBC < 5 /HPF   Sq Epi: x / Non Sq Epi: 0-5 /HPF / Bacteria: Present /HPF      CARDIAC MARKERS ( 2021 05:38 )  x     / 2.19 ng/mL / 1275 U/L / x     / 56.7 ng/mL  CARDIAC MARKERS ( 2021 18:23 )  x     / 0.67 ng/mL / 1093 U/L / x     / 64.8 ng/mL        IMAGING/EKG/ETC  EKG:  Xray:  CT:  MRI: TRANSFER NOTE CCU to     Hospital Course:  51 Y M with PMHX DMT2 on no medications (used to be on metformin 1000BID stopped 3-4 years ago) presented to the ED with intermittent chest pain for 2 weeks that worsen and become persistent at rest on day of admission. Admitted for STEMI (anterior leads), s/p DAVIS to % with a thrombectomy on , TTE on  with  EF 35-40%, transferred to the CCU for post CATH monitoring. On repeat EKG post cath diffuse ST elevation V1-V2 (no [place] depressions) and TWI.    Subjective/ROS: Patient seen and examined at bedside. Reports that chest pain has resolved. No pain at the R radial site. No complaints.     Denies HA, CP, SOB, n/v, changes in bowel/urinary habits.  12pt ROS otherwise negative.    Family History, Social History, Past Medical History, and Home Medications from admission H&P were reviewed and are unchanged unless noted below.     VITALS  Vital Signs Last 24 Hrs  T(C): 37.4 (2021 09:06), Max: 38.5 (2021 21:57)  T(F): 99.3 (2021 09:06), Max: 101.3 (2021 21:57)  HR: 65 (2021 11:00) (59 - 120)  BP: 121/68 (2021 11:00) (118/81 - 157/93)  BP(mean): 89 (2021 11:00) (89 - 105)  RR: 19 (2021 11:00) (16 - 31)  SpO2: 96% (2021 11:00) (93% - 98%)    CAPILLARY BLOOD GLUCOSE      POCT Blood Glucose.: 216 mg/dL (2021 11:15)  POCT Blood Glucose.: 210 mg/dL (2021 06:12)  POCT Blood Glucose.: 201 mg/dL (2021 21:37)      PHYSICAL EXAM  General: A&Ox3; NAD  Head: NC/AT; MMM; PERRL; EOMI;  Neck: Supple; no JVD  Respiratory: CTA B/L; no wheezes/crackles   Cardiovascular: Regular rhythm/rate; S1/S2   Gastrointestinal: Soft; NTND; normoactive BS  Extremities: WWP; no edema/cyanosis, R radial site C/D/I no hematoma or oozing  Neurological:  CNII-XII grossly intact; no obvious focal deficits    MEDICATIONS  (STANDING):  aspirin enteric coated 81 milliGRAM(s) Oral daily  atorvastatin 80 milliGRAM(s) Oral at bedtime  dextrose 40% Gel 15 Gram(s) Oral once  dextrose 5%. 1000 milliLiter(s) (50 mL/Hr) IV Continuous <Continuous>  dextrose 50% Injectable 25 Gram(s) IV Push once  enoxaparin Injectable 40 milliGRAM(s) SubCutaneous every 24 hours  glucagon  Injectable 1 milliGRAM(s) IntraMuscular once  insulin lispro (ADMELOG) corrective regimen sliding scale   SubCutaneous three times a day before meals  insulin lispro (ADMELOG) corrective regimen sliding scale   SubCutaneous at bedtime  losartan 25 milliGRAM(s) Oral daily  metoprolol tartrate 12.5 milliGRAM(s) Oral two times a day  ticagrelor 90 milliGRAM(s) Oral every 12 hours    MEDICATIONS  (PRN):  acetaminophen   Tablet .. 650 milliGRAM(s) Oral every 6 hours PRN Temp greater or equal to 38C (100.4F)  nitroglycerin     SubLingual 0.4 milliGRAM(s) SubLingual every 5 minutes PRN Chest Pain      No Known Allergies      LABS                        14.9   9.85  )-----------( 212      ( 2021 05:38 )             43.0     06-18    137  |  103  |  10  ----------------------------<  223<H>  3.7   |  21<L>  |  0.73    Ca    8.8      2021 05:38  Phos  2.8     06-18  Mg     2.0     06-18    TPro  6.8  /  Alb  3.5  /  TBili  1.5<H>  /  DBili  x   /  AST  104<H>  /  ALT  38  /  AlkPhos  66  06-18    PT/INR - ( 2021 18:23 )   PT: 13.5 sec;   INR: 1.13          PTT - ( 2021 20:55 )  PTT:170.8 sec  Urinalysis Basic - ( 2021 00:26 )    Color: Yellow / Appearance: Clear / S.010 / pH: x  Gluc: x / Ketone: 40 mg/dL  / Bili: Negative / Urobili: 0.2 E.U./dL   Blood: x / Protein: 100 mg/dL / Nitrite: NEGATIVE   Leuk Esterase: NEGATIVE / RBC: < 5 /HPF / WBC < 5 /HPF   Sq Epi: x / Non Sq Epi: 0-5 /HPF / Bacteria: Present /HPF      CARDIAC MARKERS ( 2021 05:38 )  x     / 2.19 ng/mL / 1275 U/L / x     / 56.7 ng/mL  CARDIAC MARKERS ( 2021 18:23 )  x     / 0.67 ng/mL / 1093 U/L / x     / 64.8 ng/mL        IMAGING/EKG/ETC REVIEWED

## 2021-06-18 NOTE — PROGRESS NOTE ADULT - PROBLEM SELECTOR PLAN 3
on no medication. FH of DMT2 on both parents.   -HA1c 8.9  -mISS   -Consider start metformin 500mg BID after dc

## 2021-06-18 NOTE — DISCHARGE NOTE PROVIDER - CARE PROVIDERS DIRECT ADDRESSES
,[email or web address] ,DirectAddress_Unknown ,DirectAddress_Unknown,[email or web address]

## 2021-06-18 NOTE — DISCHARGE NOTE PROVIDER - NSDCMRMEDTOKEN_GEN_ALL_CORE_FT
aspirin 81 mg oral delayed release tablet: 1 tab(s) orally once a day  atorvastatin 80 mg oral tablet: 1 tab(s) orally once a day (at bedtime)  Brilinta (ticagrelor) 90 mg oral tablet: 1 tab(s) orally 2 times a day   metFORMIN 500 mg oral tablet: 1 tab(s) orally 2 times a day   metoprolol succinate 25 mg oral tablet, extended release: 1 tab(s) orally once a day   aspirin 81 mg oral delayed release tablet: 1 tab(s) orally once a day  atorvastatin 80 mg oral tablet: 1 tab(s) orally once a day (at bedtime)  Brilinta (ticagrelor) 90 mg oral tablet: 1 tab(s) orally 2 times a day   Entresto 24 mg-26 mg oral tablet: 1 tab(s) orally 2 times a day   metFORMIN 500 mg oral tablet: 1 tab(s) orally 2 times a day   metoprolol succinate 25 mg oral tablet, extended release: 1 tab(s) orally once a day

## 2021-06-18 NOTE — PROGRESS NOTE ADULT - PROBLEM SELECTOR PLAN 1
Presented to the ED with pressure like exertional chest pain for 2 weeks relived by rest, found ST elevations in V2-V3. diagnosed with STEMI and s/p cath received one DAVIS on %.  CATH report 6/17:  L dominant. LM normal. % thrombotic . LVEF 35-40% mod Ant wall hypokinesis, Successful thrombectomy +PCI on LAD DAVIS   -S/p Brillinta and ASA load in the ED, s/p hep drip prior PCI  -C/w ASA 81 mg daily  -C/w Brilinta 90mg BID  -C/w Atorvastatin 80mg bedtime  -C/w Lopressor 12.5 BID   -C/w Losartan 25mg daily - will f/u with pt pharmacy if covered for Entresto.  -F/u official TTE  -Trop up trending post cath, 0.67 -> 2.19; CKMB downtrending 64.8 -> 56.7; expected post PCI with reperfusion f/u trop q6 to pick.      #CAD  - Management as above Presented to the ED with pressure like exertional chest pain for 2 weeks relived by rest, found ST elevations in V2-V3. diagnosed with STEMI and s/p cath received one DAVIS on %.  CATH report 6/17:  L dominant. LM normal. % thrombotic . LVEF 35-40% mod Ant wall hypokinesis, Successful thrombectomy +PCI on LAD DAVIS   -S/p Brillinta and ASA load in the ED, s/p hep drip prior PCI  -C/w ASA 81 mg daily, Brilinta 90mg BID, Atorvastatin 80mg bedtime, Lopressor 12.5 BID   -C/w Losartan 25mg daily - will f/u with pt pharmacy if covered for Entresto.  - TTE 6/18: Mild symmetric left ventricular hypertrophy. Moderate segmental left ventricular systolic dysfunction with severe hypokineisis of mid anteroseptum, mid inferoseptum, apical septum, apical inferior, apex proper, and apical anterior. LVEF is 35%. Grade I left ventricular diastolic dysfunction.    #CAD  - Management as above

## 2021-06-18 NOTE — DISCHARGE NOTE PROVIDER - NSDCFUADDAPPT_GEN_ALL_CORE_FT
Please follow up with Dr. Oconnor on 6/30 at 1:30pm Please follow up with cardiology provider Dr. Oconnor on 6/30 at 1:30pm    Please follow up with Dr. Crook of interventional cardiology 6/24 at 11:30am

## 2021-06-18 NOTE — PROGRESS NOTE ADULT - PROBLEM SELECTOR PLAN 2
No history, new onset. LVEF 35-40% Likely acute reduced in setting of STEMI  -C/w Lopressor 12.5 BID   -C/w Losartan 25mg daily - will f/u with pt pharmacy if covered for Entresto.  -F/u official TTE  - Outpatient cardiology follow up

## 2021-06-18 NOTE — DISCHARGE NOTE PROVIDER - NSDCCPCAREPLAN_GEN_ALL_CORE_FT
PRINCIPAL DISCHARGE DIAGNOSIS  Diagnosis: STEMI (ST elevation myocardial infarction)  Assessment and Plan of Treatment: When you came to the hospital, you were found to have a heart attack, which is caused by blockage of the vessels that feed the heart muscle. The blockage was caused by coronary artery disease, which is damage to the heart's blood vessels from a buildup of plaque. This causes coronary arteries to narrow, limiting blood flow to the heart. To fix this, a stent was placed in the blocked blood vessel, and you were started on medications to help prevent further build up and reduce your risk of another heart attack.  - Please take aspirin, brillinta, and atorvastatin every day to reduce the risk of future heart attacks.  - Please follow up with your cardiologist regarding your recent hospitalization.  - If you have severe chest pain or pressure, or shortness of breath, this may be a sign of another heart attack. Please call your doctor or consider returning to the emergency room for further evaluation.      SECONDARY DISCHARGE DIAGNOSES  Diagnosis: CAD (coronary artery disease)  Assessment and Plan of Treatment: You underwent a cardiac catheterization on 6/17 and the blockages in your LAD Artery were opened with stent placement.  NEVER MISS A DOSE OF ASPIRIN and Brilinta; IF YOU DO, YOU ARE AT RISK OF YOUR ANGIOPLASTY SITE and/or STENT CLOSING AND HAVING A HEART ATTACK. DO NOT STOP THESE TWO MEDICATIONS UNLESS INSTRUCTED TO DO SO BY YOUR CARDIOLOGIST. Your procedure was done through your wrist.  You may shower but Avoid tub baths, hot tubs or swimming for 5 days to prevent infection.  You do not need to keep this area covered. You should wait 3 days before returning to ordinary activities. Do not drive for 2 days. Do not lift more than 5 pounds with affected arm for 3 days.  If you develop any swelling, bleeding, hardening of the skin (hematoma formation), acute pain, numbness/tingling  in your arm please contact your doctor immediately or call our 24/7 line: [phone] or go to nearest ER. Please return to the hospital/seek immediate medical attention if you have worsening symptoms of chest pain, shortness of breath.   Please follow up with your Cardiologist. All of your prescriptions have been sent electronically to your pharmacy.  Please continue a heart healthy Plant based Mediterranean Diet low in sodium, cholesterol, and fat.  You have been given a Stent Card to carry with you in your wallet.  Make Photocopies or take a picture of card so you have a backup copy in event the Original card is lost.  This card has important information for any possible future Radiology/MRI studies.    We have provided you with a prescription for cardiac rehab which is medically supervised exercise program for your heart. You should attend cardiac rehab 3 times per week for 12 weeks. We have provided you with a list of nearby facilities. Please call your insurance carrier to determine which of these facilities are covered under your plan. Please bring this prescription with you to your cardiology appointment    Diagnosis: Acute HFrEF (heart failure with reduced ejection fraction)  Assessment and Plan of Treatment: You were found to have congestive heart failure on imaging of your heart.  Congestive heart failure can cause make it harder for your heart to pump blood to the rest of your body. As a result, blood can get backed up into your lungs or into your legs. In order to avoid this, make sure to take all of your medications for heart failure as prescribed. This will keep your heart functioning well. If you notice increased difficulty with breathing, cough with bloody mucous, increased swelling in the legs, or chest pain be sure to contact your PCP or call 911 as you may need more treatment. Additionally be sure to follow up with your PCP and Cardiologist on a regular basis to make sure no additional medications or medication changes need to be mades    Diagnosis: DM (diabetes mellitus)  Assessment and Plan of Treatment: You have a diagnosis of type 2 diabetes (sometimes called type 2 "diabetes mellitus"). This is a disorder that disrupts the way your body uses sugar. All the cells in your body need sugar to work normally. Sugar gets into the cells with the help of a hormone called insulin. If there is not enough insulin, or if the body stops responding to insulin, sugar builds up in the blood. That is what happens to people with diabetes. Type 2 diabetes usually causes no symptoms. When symptoms do occur, they include the need to urinate often, intense thirst and blurry vision. Even though type 2 diabetes might not make you feel sick, it can cause serious problems over time, if it is not treated. The disorder can lead to heart attacks, strokes, kidney disease, vision problems (or even blindness), pain or loss of feeling in the hands and feet, and the need to have fingers, toes, or other body parts removed (amputated). In addition to maintaining an active lifestyle, losing weight, eating right, and not smoking it is important to take your diabetes medications as directed to control your blood sugar and prevent the possible complications from this disease.     PRINCIPAL DISCHARGE DIAGNOSIS  Diagnosis: STEMI (ST elevation myocardial infarction)  Assessment and Plan of Treatment: When you came to the hospital, you were found to have a heart attack, which is caused by blockage of the vessels that feed the heart muscle. The blockage was caused by coronary artery disease, which is damage to the heart's blood vessels from a buildup of plaque. This causes coronary arteries to narrow, limiting blood flow to the heart. To fix this, a stent was placed in the blocked blood vessel, and you were started on medications to help prevent further build up and reduce your risk of another heart attack.  - Please take aspirin, brillinta, and atorvastatin every day to reduce the risk of future heart attacks.  - Please follow up with your cardiologist regarding your recent hospitalization.  - If you have severe chest pain or pressure, or shortness of breath, this may be a sign of another heart attack. Please call your doctor or consider returning to the emergency room for further evaluation.      SECONDARY DISCHARGE DIAGNOSES  Diagnosis: CAD (coronary artery disease)  Assessment and Plan of Treatment: You underwent a cardiac catheterization on 6/17 and the blockages in your LAD Artery were opened with stent placement.  NEVER MISS A DOSE OF ASPIRIN and Brilinta; IF YOU DO, YOU ARE AT RISK OF YOUR ANGIOPLASTY SITE and/or STENT CLOSING AND HAVING A HEART ATTACK. DO NOT STOP THESE TWO MEDICATIONS UNLESS INSTRUCTED TO DO SO BY YOUR CARDIOLOGIST. Your procedure was done through your wrist.  You may shower but Avoid tub baths, hot tubs or swimming for 5 days to prevent infection.  You do not need to keep this area covered. You should wait 3 days before returning to ordinary activities. Do not drive for 2 days. Do not lift more than 5 pounds with affected arm for 3 days.  If you develop any swelling, bleeding, hardening of the skin (hematoma formation), acute pain, numbness/tingling  in your arm please contact your doctor immediately or call our 24/7 line: [phone] or go to nearest ER. Please return to the hospital/seek immediate medical attention if you have worsening symptoms of chest pain, shortness of breath.   Please follow up with your Cardiologist. All of your prescriptions have been sent electronically to your pharmacy.  Please continue a heart healthy Plant based Mediterranean Diet low in sodium, cholesterol, and fat.  You have been given a Stent Card to carry with you in your wallet.  Make Photocopies or take a picture of card so you have a backup copy in event the Original card is lost.  This card has important information for any possible future Radiology/MRI studies.    We have provided you with a prescription for cardiac rehab which is medically supervised exercise program for your heart. You should attend cardiac rehab 3 times per week for 12 weeks. We have provided you with a list of nearby facilities. Please call your insurance carrier to determine which of these facilities are covered under your plan. Please bring this prescription with you to your cardiology appointment    Diagnosis: Acute HFrEF (heart failure with reduced ejection fraction)  Assessment and Plan of Treatment: You were found to have congestive heart failure on imaging of your heart.  Congestive heart failure can cause make it harder for your heart to pump blood to the rest of your body. As a result, blood can get backed up into your lungs or into your legs. In order to avoid this, make sure to take all of your medications for heart failure as prescribed. This will keep your heart functioning well. If you notice increased difficulty with breathing, cough with bloody mucous, increased swelling in the legs, or chest pain be sure to contact your PCP or call 911 as you may need more treatment. Additionally be sure to follow up with your PCP and Cardiologist on a regular basis to make sure no additional medications or medication changes need to be mades    Diagnosis: DM (diabetes mellitus)  Assessment and Plan of Treatment: You have a diagnosis of type 2 diabetes (sometimes called type 2 "diabetes mellitus"). This is a disorder that disrupts the way your body uses sugar. All the cells in your body need sugar to work normally. Sugar gets into the cells with the help of a hormone called insulin. If there is not enough insulin, or if the body stops responding to insulin, sugar builds up in the blood. That is what happens to people with diabetes. Type 2 diabetes usually causes no symptoms. When symptoms do occur, they include the need to urinate often, intense thirst and blurry vision. Even though type 2 diabetes might not make you feel sick, it can cause serious problems over time, if it is not treated. The disorder can lead to heart attacks, strokes, kidney disease, vision problems (or even blindness), pain or loss of feeling in the hands and feet, and the need to have fingers, toes, or other body parts removed (amputated). In addition to maintaining an active lifestyle, losing weight, eating right, and not smoking it is important to take your diabetes medications as directed to control your blood sugar and prevent the possible complications from this disease.  -----  please take your Metformin 500mg prescription twice a day.

## 2021-06-18 NOTE — PROGRESS NOTE ADULT - ASSESSMENT
51 Y M with PMHX DMT2 on no medications presented to the ED with chest pain for 2 weeks, Admitted for STEMI, s/p DAVIS  %, EF 35-40% on 6/17 transferred to the CCU for post CATH monitoring.

## 2021-06-18 NOTE — DISCHARGE NOTE PROVIDER - CARE PROVIDER_API CALL
Ramu Gonzales (MD)  Cardiovascular Disease; Internal Medicine  Cardiology [facility], [address] [place] [address]  Phone: [phone]  Fax: [phone]  Follow Up Time:    Albino Oconnor)  Cardiology; Internal Medicine  [address] [place] [address]  Phone: [phone]  Fax: [phone]  Scheduled Appointment: 06/30/2021   Albino Oconnor)  Cardiology; Internal Medicine  [address] [address]  Phone: [phone]  Fax: [phone]  Scheduled Appointment: 06/30/2021 01:30 PM    Regino Crook)  Cardiovascular Disease; Interventional Cardiology  [address] [place] [address]  Phone: [phone]  Fax: [phone]  Follow Up Time:    Albino Oconnor)  Cardiology; Internal Medicine  [address] [address]  Phone: [phone]  Fax: [phone]  Scheduled Appointment: 06/30/2021 01:30 PM    Regino Crook)  Cardiovascular Disease; Interventional Cardiology  [address] [place] [address]  Phone: [phone]  Fax: [phone]  Scheduled Appointment: 06/24/2021 11:30 AM

## 2021-06-19 ENCOUNTER — TRANSCRIPTION ENCOUNTER (OUTPATIENT)
Age: 51
End: 2021-06-19

## 2021-06-19 VITALS — TEMPERATURE: 99 F

## 2021-06-19 LAB
ALBUMIN SERPL ELPH-MCNC: 3.7 G/DL — SIGNIFICANT CHANGE UP (ref 3.3–5)
ALP SERPL-CCNC: 62 U/L — SIGNIFICANT CHANGE UP (ref 40–120)
ALT FLD-CCNC: 28 U/L — SIGNIFICANT CHANGE UP (ref 10–45)
ANION GAP SERPL CALC-SCNC: 12 MMOL/L — SIGNIFICANT CHANGE UP (ref 5–17)
AST SERPL-CCNC: 36 U/L — SIGNIFICANT CHANGE UP (ref 10–40)
BASOPHILS # BLD AUTO: 0.02 K/UL — SIGNIFICANT CHANGE UP (ref 0–0.2)
BASOPHILS NFR BLD AUTO: 0.2 % — SIGNIFICANT CHANGE UP (ref 0–2)
BILIRUB SERPL-MCNC: 1.1 MG/DL — SIGNIFICANT CHANGE UP (ref 0.2–1.2)
BLD GP AB SCN SERPL QL: NEGATIVE — SIGNIFICANT CHANGE UP
BUN SERPL-MCNC: 12 MG/DL — SIGNIFICANT CHANGE UP (ref 7–23)
CALCIUM SERPL-MCNC: 8.8 MG/DL — SIGNIFICANT CHANGE UP (ref 8.4–10.5)
CHLORIDE SERPL-SCNC: 104 MMOL/L — SIGNIFICANT CHANGE UP (ref 96–108)
CO2 SERPL-SCNC: 22 MMOL/L — SIGNIFICANT CHANGE UP (ref 22–31)
CREAT SERPL-MCNC: 0.76 MG/DL — SIGNIFICANT CHANGE UP (ref 0.5–1.3)
CULTURE RESULTS: SIGNIFICANT CHANGE UP
EOSINOPHIL # BLD AUTO: 0.08 K/UL — SIGNIFICANT CHANGE UP (ref 0–0.5)
EOSINOPHIL NFR BLD AUTO: 0.9 % — SIGNIFICANT CHANGE UP (ref 0–6)
GLUCOSE BLDC GLUCOMTR-MCNC: 153 MG/DL — HIGH (ref 70–99)
GLUCOSE BLDC GLUCOMTR-MCNC: 210 MG/DL — HIGH (ref 70–99)
GLUCOSE SERPL-MCNC: 188 MG/DL — HIGH (ref 70–99)
HCT VFR BLD CALC: 44.1 % — SIGNIFICANT CHANGE UP (ref 39–50)
HGB BLD-MCNC: 15.3 G/DL — SIGNIFICANT CHANGE UP (ref 13–17)
IMM GRANULOCYTES NFR BLD AUTO: 0.6 % — SIGNIFICANT CHANGE UP (ref 0–1.5)
LYMPHOCYTES # BLD AUTO: 1.5 K/UL — SIGNIFICANT CHANGE UP (ref 1–3.3)
LYMPHOCYTES # BLD AUTO: 17.7 % — SIGNIFICANT CHANGE UP (ref 13–44)
MAGNESIUM SERPL-MCNC: 2 MG/DL — SIGNIFICANT CHANGE UP (ref 1.6–2.6)
MCHC RBC-ENTMCNC: 29.5 PG — SIGNIFICANT CHANGE UP (ref 27–34)
MCHC RBC-ENTMCNC: 34.7 GM/DL — SIGNIFICANT CHANGE UP (ref 32–36)
MCV RBC AUTO: 85 FL — SIGNIFICANT CHANGE UP (ref 80–100)
MONOCYTES # BLD AUTO: 0.56 K/UL — SIGNIFICANT CHANGE UP (ref 0–0.9)
MONOCYTES NFR BLD AUTO: 6.6 % — SIGNIFICANT CHANGE UP (ref 2–14)
NEUTROPHILS # BLD AUTO: 6.28 K/UL — SIGNIFICANT CHANGE UP (ref 1.8–7.4)
NEUTROPHILS NFR BLD AUTO: 74 % — SIGNIFICANT CHANGE UP (ref 43–77)
NRBC # BLD: 0 /100 WBCS — SIGNIFICANT CHANGE UP (ref 0–0)
PHOSPHATE SERPL-MCNC: 2.1 MG/DL — LOW (ref 2.5–4.5)
PLATELET # BLD AUTO: 224 K/UL — SIGNIFICANT CHANGE UP (ref 150–400)
POTASSIUM SERPL-MCNC: 3.7 MMOL/L — SIGNIFICANT CHANGE UP (ref 3.5–5.3)
POTASSIUM SERPL-SCNC: 3.7 MMOL/L — SIGNIFICANT CHANGE UP (ref 3.5–5.3)
PROT SERPL-MCNC: 7.3 G/DL — SIGNIFICANT CHANGE UP (ref 6–8.3)
RBC # BLD: 5.19 M/UL — SIGNIFICANT CHANGE UP (ref 4.2–5.8)
RBC # FLD: 13.3 % — SIGNIFICANT CHANGE UP (ref 10.3–14.5)
RH IG SCN BLD-IMP: POSITIVE — SIGNIFICANT CHANGE UP
SODIUM SERPL-SCNC: 138 MMOL/L — SIGNIFICANT CHANGE UP (ref 135–145)
SPECIMEN SOURCE: SIGNIFICANT CHANGE UP
WBC # BLD: 8.49 K/UL — SIGNIFICANT CHANGE UP (ref 3.8–10.5)
WBC # FLD AUTO: 8.49 K/UL — SIGNIFICANT CHANGE UP (ref 3.8–10.5)

## 2021-06-19 PROCEDURE — 82550 ASSAY OF CK (CPK): CPT

## 2021-06-19 PROCEDURE — 96375 TX/PRO/DX INJ NEW DRUG ADDON: CPT

## 2021-06-19 PROCEDURE — 87635 SARS-COV-2 COVID-19 AMP PRB: CPT

## 2021-06-19 PROCEDURE — C1757: CPT

## 2021-06-19 PROCEDURE — 85025 COMPLETE CBC W/AUTO DIFF WBC: CPT

## 2021-06-19 PROCEDURE — 99285 EMERGENCY DEPT VISIT HI MDM: CPT

## 2021-06-19 PROCEDURE — C1887: CPT

## 2021-06-19 PROCEDURE — 82962 GLUCOSE BLOOD TEST: CPT

## 2021-06-19 PROCEDURE — 36415 COLL VENOUS BLD VENIPUNCTURE: CPT

## 2021-06-19 PROCEDURE — 81001 URINALYSIS AUTO W/SCOPE: CPT

## 2021-06-19 PROCEDURE — C1894: CPT

## 2021-06-19 PROCEDURE — 84100 ASSAY OF PHOSPHORUS: CPT

## 2021-06-19 PROCEDURE — 86850 RBC ANTIBODY SCREEN: CPT

## 2021-06-19 PROCEDURE — 71045 X-RAY EXAM CHEST 1 VIEW: CPT | Mod: 26

## 2021-06-19 PROCEDURE — 86900 BLOOD TYPING SEROLOGIC ABO: CPT

## 2021-06-19 PROCEDURE — 84484 ASSAY OF TROPONIN QUANT: CPT

## 2021-06-19 PROCEDURE — C1769: CPT

## 2021-06-19 PROCEDURE — C1874: CPT

## 2021-06-19 PROCEDURE — 83036 HEMOGLOBIN GLYCOSYLATED A1C: CPT

## 2021-06-19 PROCEDURE — 87040 BLOOD CULTURE FOR BACTERIA: CPT

## 2021-06-19 PROCEDURE — C1725: CPT

## 2021-06-19 PROCEDURE — 80053 COMPREHEN METABOLIC PANEL: CPT

## 2021-06-19 PROCEDURE — 96374 THER/PROPH/DIAG INJ IV PUSH: CPT

## 2021-06-19 PROCEDURE — 82553 CREATINE MB FRACTION: CPT

## 2021-06-19 PROCEDURE — 93306 TTE W/DOPPLER COMPLETE: CPT

## 2021-06-19 PROCEDURE — 93005 ELECTROCARDIOGRAM TRACING: CPT

## 2021-06-19 PROCEDURE — 83735 ASSAY OF MAGNESIUM: CPT

## 2021-06-19 PROCEDURE — 85610 PROTHROMBIN TIME: CPT

## 2021-06-19 PROCEDURE — 84443 ASSAY THYROID STIM HORMONE: CPT

## 2021-06-19 PROCEDURE — 99291 CRITICAL CARE FIRST HOUR: CPT | Mod: 25

## 2021-06-19 PROCEDURE — 99239 HOSP IP/OBS DSCHRG MGMT >30: CPT

## 2021-06-19 PROCEDURE — 86901 BLOOD TYPING SEROLOGIC RH(D): CPT

## 2021-06-19 PROCEDURE — 85730 THROMBOPLASTIN TIME PARTIAL: CPT

## 2021-06-19 PROCEDURE — 80061 LIPID PANEL: CPT

## 2021-06-19 PROCEDURE — 87086 URINE CULTURE/COLONY COUNT: CPT

## 2021-06-19 PROCEDURE — 71045 X-RAY EXAM CHEST 1 VIEW: CPT

## 2021-06-19 PROCEDURE — 86769 SARS-COV-2 COVID-19 ANTIBODY: CPT

## 2021-06-19 PROCEDURE — 93308 TTE F-UP OR LMTD: CPT

## 2021-06-19 RX ORDER — ASPIRIN/CALCIUM CARB/MAGNESIUM 324 MG
1 TABLET ORAL
Qty: 30 | Refills: 0
Start: 2021-06-19 | End: 2021-07-18

## 2021-06-19 RX ORDER — METFORMIN HYDROCHLORIDE 850 MG/1
1 TABLET ORAL
Qty: 60 | Refills: 0
Start: 2021-06-19 | End: 2021-07-18

## 2021-06-19 RX ORDER — SACUBITRIL AND VALSARTAN 24; 26 MG/1; MG/1
1 TABLET, FILM COATED ORAL
Qty: 60 | Refills: 0
Start: 2021-06-19 | End: 2021-07-18

## 2021-06-19 RX ORDER — TICAGRELOR 90 MG/1
1 TABLET ORAL
Qty: 60 | Refills: 5
Start: 2021-06-19 | End: 2021-12-15

## 2021-06-19 RX ORDER — METOPROLOL TARTRATE 50 MG
1 TABLET ORAL
Qty: 30 | Refills: 0
Start: 2021-06-19 | End: 2021-07-18

## 2021-06-19 RX ORDER — ATORVASTATIN CALCIUM 80 MG/1
1 TABLET, FILM COATED ORAL
Qty: 30 | Refills: 0
Start: 2021-06-19 | End: 2021-07-18

## 2021-06-19 RX ORDER — LOSARTAN POTASSIUM 100 MG/1
1 TABLET, FILM COATED ORAL
Qty: 30 | Refills: 0
Start: 2021-06-19 | End: 2021-07-18

## 2021-06-19 RX ADMIN — Medication 25 MILLIGRAM(S): at 06:02

## 2021-06-19 RX ADMIN — LOSARTAN POTASSIUM 25 MILLIGRAM(S): 100 TABLET, FILM COATED ORAL at 06:02

## 2021-06-19 RX ADMIN — Medication 81 MILLIGRAM(S): at 10:57

## 2021-06-19 RX ADMIN — Medication 2: at 07:05

## 2021-06-19 RX ADMIN — TICAGRELOR 90 MILLIGRAM(S): 90 TABLET ORAL at 06:02

## 2021-06-19 RX ADMIN — Medication 4: at 11:57

## 2021-06-19 NOTE — DISCHARGE NOTE NURSING/CASE MANAGEMENT/SOCIAL WORK - PATIENT PORTAL LINK FT
You can access the FollowMyHealth Patient Portal offered by [facility] by registering at the following website: [email or web address]. By joining [facility]’s FollowMyHealth portal, you will also be able to view your health information using other applications (apps) compatible with our system.

## 2021-06-19 NOTE — DISCHARGE NOTE NURSING/CASE MANAGEMENT/SOCIAL WORK - NSDCFUADDAPPT_GEN_ALL_CORE_FT
Please follow up with cardiology provider Dr. Oconnor on 6/30 at 1:30pm    Please follow up with Dr. Crook of interventional cardiology 6/24 at 11:30am

## 2021-06-21 PROBLEM — Z00.00 ENCOUNTER FOR PREVENTIVE HEALTH EXAMINATION: Status: ACTIVE | Noted: 2021-06-21

## 2021-06-22 LAB
CULTURE RESULTS: SIGNIFICANT CHANGE UP
CULTURE RESULTS: SIGNIFICANT CHANGE UP
SPECIMEN SOURCE: SIGNIFICANT CHANGE UP
SPECIMEN SOURCE: SIGNIFICANT CHANGE UP

## 2021-06-24 DIAGNOSIS — I25.10 ATHEROSCLEROTIC HEART DISEASE OF NATIVE CORONARY ARTERY WITHOUT ANGINA PECTORIS: ICD-10-CM

## 2021-06-24 DIAGNOSIS — I11.0 HYPERTENSIVE HEART DISEASE WITH HEART FAILURE: ICD-10-CM

## 2021-06-24 DIAGNOSIS — R76.0 RAISED ANTIBODY TITER: ICD-10-CM

## 2021-06-24 DIAGNOSIS — E11.9 TYPE 2 DIABETES MELLITUS WITHOUT COMPLICATIONS: ICD-10-CM

## 2021-06-24 DIAGNOSIS — R50.82 POSTPROCEDURAL FEVER: ICD-10-CM

## 2021-06-24 DIAGNOSIS — R00.0 TACHYCARDIA, UNSPECIFIED: ICD-10-CM

## 2021-06-24 DIAGNOSIS — Z83.3 FAMILY HISTORY OF DIABETES MELLITUS: ICD-10-CM

## 2021-06-24 DIAGNOSIS — L71.9 ROSACEA, UNSPECIFIED: ICD-10-CM

## 2021-06-24 DIAGNOSIS — E78.5 HYPERLIPIDEMIA, UNSPECIFIED: ICD-10-CM

## 2021-06-24 DIAGNOSIS — I21.02 ST ELEVATION (STEMI) MYOCARDIAL INFARCTION INVOLVING LEFT ANTERIOR DESCENDING CORONARY ARTERY: ICD-10-CM

## 2021-06-24 DIAGNOSIS — E66.9 OBESITY, UNSPECIFIED: ICD-10-CM

## 2021-06-24 DIAGNOSIS — I50.21 ACUTE SYSTOLIC (CONGESTIVE) HEART FAILURE: ICD-10-CM

## 2021-06-28 ENCOUNTER — TRANSCRIPTION ENCOUNTER (OUTPATIENT)
Age: 51
End: 2021-06-28

## 2021-06-30 ENCOUNTER — APPOINTMENT (OUTPATIENT)
Dept: HEART AND VASCULAR | Facility: CLINIC | Age: 51
End: 2021-06-30